# Patient Record
Sex: FEMALE | Race: WHITE | NOT HISPANIC OR LATINO | Employment: OTHER | URBAN - METROPOLITAN AREA
[De-identification: names, ages, dates, MRNs, and addresses within clinical notes are randomized per-mention and may not be internally consistent; named-entity substitution may affect disease eponyms.]

---

## 2017-06-17 LAB
BILIRUB SERPL-MCNC: NEGATIVE MG/DL
BLOOD URINE, POC: NEGATIVE
CLARITY, POC UA: NORMAL
COLOR, POC UA: YELLOW
GLUCOSE UR QL STRIP: NEGATIVE
KETONES UR QL STRIP: NEGATIVE
LEUKOCYTE EST, POC UA: NEGATIVE
NITRITE, POC UA: NEGATIVE
PH, POC UA: 6.5
PROTEIN, POC: NEGATIVE
SPECIFIC GRAVITY, POC UA: 1.01
UROBILINOGEN, POC UA: NORMAL

## 2018-05-25 LAB — RAPID GROUP A STREP (OHS): POSITIVE

## 2018-09-24 LAB — RAPID GROUP A STREP (OHS): NEGATIVE

## 2018-12-04 ENCOUNTER — HISTORICAL (OUTPATIENT)
Dept: ADMINISTRATIVE | Facility: HOSPITAL | Age: 73
End: 2018-12-04

## 2019-07-23 ENCOUNTER — HISTORICAL (OUTPATIENT)
Dept: URGENT CARE | Facility: CLINIC | Age: 74
End: 2019-07-23

## 2019-07-23 LAB
BILIRUB SERPL-MCNC: NEGATIVE MG/DL
BLOOD URINE, POC: NEGATIVE
CLARITY, POC UA: CLEAR
COLOR, POC UA: NORMAL
GLUCOSE UR QL STRIP: NEGATIVE
KETONES UR QL STRIP: NEGATIVE
LEUKOCYTE EST, POC UA: NORMAL
NITRITE, POC UA: POSITIVE
PH, POC UA: 8
PROTEIN, POC: NEGATIVE
SPECIFIC GRAVITY, POC UA: 1.01
UROBILINOGEN, POC UA: NORMAL

## 2019-11-23 LAB — RAPID GROUP A STREP (OHS): NEGATIVE

## 2020-05-13 ENCOUNTER — HISTORICAL (OUTPATIENT)
Dept: LAB | Facility: HOSPITAL | Age: 75
End: 2020-05-13

## 2020-07-20 ENCOUNTER — HISTORICAL (OUTPATIENT)
Dept: LAB | Facility: HOSPITAL | Age: 75
End: 2020-07-20

## 2020-07-20 LAB
ALBUMIN SERPL-MCNC: 4.2 GM/DL (ref 3.4–4.8)
ALBUMIN/GLOB SERPL: 1.6 RATIO (ref 1.1–2)
ALP SERPL-CCNC: 61 UNIT/L (ref 40–150)
ALT SERPL-CCNC: 21 UNIT/L (ref 0–55)
AST SERPL-CCNC: 23 UNIT/L (ref 5–34)
BILIRUB SERPL-MCNC: 0.6 MG/DL (ref 0.2–1.2)
BILIRUBIN DIRECT+TOT PNL SERPL-MCNC: 0.3 MG/DL (ref 0–0.5)
BILIRUBIN DIRECT+TOT PNL SERPL-MCNC: 0.3 MG/DL (ref 0–0.8)
BUN SERPL-MCNC: 8.7 MG/DL (ref 9.8–20.1)
CALCIUM SERPL-MCNC: 10.1 MG/DL (ref 8.4–10.2)
CHLORIDE SERPL-SCNC: 99 MMOL/L (ref 98–107)
CHOLEST SERPL-MCNC: 122 MG/DL
CHOLEST/HDLC SERPL: 2 {RATIO} (ref 0–5)
CO2 SERPL-SCNC: 27 MMOL/L (ref 23–31)
CREAT SERPL-MCNC: 0.81 MG/DL (ref 0.57–1.11)
GLOBULIN SER-MCNC: 2.6 GM/DL (ref 2.4–3.5)
GLUCOSE SERPL-MCNC: 101 MG/DL (ref 82–115)
HDLC SERPL-MCNC: 60 MG/DL (ref 40–60)
LDLC SERPL CALC-MCNC: 55 MG/DL (ref 50–140)
POTASSIUM SERPL-SCNC: 4.1 MMOL/L (ref 3.5–5.1)
PROT SERPL-MCNC: 6.8 GM/DL (ref 5.8–7.6)
SODIUM SERPL-SCNC: 134 MMOL/L (ref 136–145)
TRIGL SERPL-MCNC: 36 MG/DL (ref 0–150)
VLDLC SERPL CALC-MCNC: 7 MG/DL

## 2020-08-21 ENCOUNTER — HISTORICAL (OUTPATIENT)
Dept: RADIOLOGY | Facility: HOSPITAL | Age: 75
End: 2020-08-21

## 2021-11-05 ENCOUNTER — HISTORICAL (OUTPATIENT)
Dept: LAB | Facility: HOSPITAL | Age: 76
End: 2021-11-05

## 2021-11-05 LAB
ALBUMIN SERPL-MCNC: 4.1 GM/DL (ref 3.4–4.8)
ALP SERPL-CCNC: 61 UNIT/L (ref 40–150)
ALT SERPL-CCNC: 34 UNIT/L (ref 0–55)
AST SERPL-CCNC: 33 UNIT/L (ref 5–34)
BILIRUB SERPL-MCNC: 0.5 MG/DL (ref 0.2–1.2)
BILIRUBIN DIRECT+TOT PNL SERPL-MCNC: 0.2 MG/DL (ref 0–0.8)
BILIRUBIN DIRECT+TOT PNL SERPL-MCNC: 0.3 MG/DL (ref 0–0.5)
CHOLEST SERPL-MCNC: 143 MG/DL
CHOLEST/HDLC SERPL: 2 {RATIO} (ref 0–5)
HDLC SERPL-MCNC: 71 MG/DL (ref 40–60)
LDLC SERPL CALC-MCNC: 63 MG/DL (ref 50–140)
PROT SERPL-MCNC: 7.1 GM/DL (ref 5.8–7.6)
TRIGL SERPL-MCNC: 44 MG/DL (ref 0–150)
VLDLC SERPL CALC-MCNC: 9 MG/DL

## 2021-12-22 LAB
INFLUENZA A ANTIGEN, POC: NEGATIVE
INFLUENZA B ANTIGEN, POC: NEGATIVE
SARS-COV-2 RNA RESP QL NAA+PROBE: NEGATIVE

## 2022-02-10 ENCOUNTER — HISTORICAL (OUTPATIENT)
Dept: ADMINISTRATIVE | Facility: HOSPITAL | Age: 77
End: 2022-02-10

## 2022-02-10 ENCOUNTER — HISTORICAL (OUTPATIENT)
Dept: RADIOLOGY | Facility: HOSPITAL | Age: 77
End: 2022-02-10

## 2022-03-20 ENCOUNTER — HISTORICAL (OUTPATIENT)
Dept: URGENT CARE | Facility: CLINIC | Age: 77
End: 2022-03-20

## 2022-04-12 ENCOUNTER — HISTORICAL (OUTPATIENT)
Dept: ADMINISTRATIVE | Facility: HOSPITAL | Age: 77
End: 2022-04-12
Payer: MEDICARE

## 2022-04-30 VITALS
BODY MASS INDEX: 28.07 KG/M2 | HEIGHT: 64 IN | SYSTOLIC BLOOD PRESSURE: 168 MMHG | OXYGEN SATURATION: 96 % | DIASTOLIC BLOOD PRESSURE: 82 MMHG | WEIGHT: 164.44 LBS

## 2022-09-01 ENCOUNTER — APPOINTMENT (OUTPATIENT)
Dept: LAB | Facility: HOSPITAL | Age: 77
End: 2022-09-01
Attending: INTERNAL MEDICINE
Payer: MEDICARE

## 2022-09-01 DIAGNOSIS — I25.10 CORONARY ARTERY DISEASE, UNSPECIFIED VESSEL OR LESION TYPE, UNSPECIFIED WHETHER ANGINA PRESENT, UNSPECIFIED WHETHER NATIVE OR TRANSPLANTED HEART: Primary | ICD-10-CM

## 2022-09-01 DIAGNOSIS — E78.2 MIXED HYPERLIPIDEMIA: ICD-10-CM

## 2022-09-01 LAB
ALBUMIN SERPL-MCNC: 4.3 GM/DL (ref 3.4–4.8)
ALBUMIN/GLOB SERPL: 1.4 RATIO (ref 1.1–2)
ALP SERPL-CCNC: 60 UNIT/L (ref 40–150)
ALT SERPL-CCNC: 29 UNIT/L (ref 0–55)
AST SERPL-CCNC: 30 UNIT/L (ref 5–34)
BILIRUBIN DIRECT+TOT PNL SERPL-MCNC: 0.6 MG/DL
BUN SERPL-MCNC: 14 MG/DL (ref 9.8–20.1)
CALCIUM SERPL-MCNC: 10.3 MG/DL (ref 8.4–10.2)
CHLORIDE SERPL-SCNC: 104 MMOL/L (ref 98–107)
CHOLEST SERPL-MCNC: 149 MG/DL
CHOLEST/HDLC SERPL: 2 {RATIO} (ref 0–5)
CO2 SERPL-SCNC: 28 MMOL/L (ref 23–31)
CREAT SERPL-MCNC: 0.8 MG/DL (ref 0.55–1.02)
GFR SERPLBLD CREATININE-BSD FMLA CKD-EPI: >60 MLS/MIN/1.73/M2
GLOBULIN SER-MCNC: 3.1 GM/DL (ref 2.4–3.5)
GLUCOSE SERPL-MCNC: 101 MG/DL (ref 82–115)
HDLC SERPL-MCNC: 72 MG/DL (ref 35–60)
LDLC SERPL CALC-MCNC: 67 MG/DL (ref 50–140)
POTASSIUM SERPL-SCNC: 4 MMOL/L (ref 3.5–5.1)
PROT SERPL-MCNC: 7.4 GM/DL (ref 5.8–7.6)
SODIUM SERPL-SCNC: 143 MMOL/L (ref 136–145)
TRIGL SERPL-MCNC: 50 MG/DL (ref 37–140)
VLDLC SERPL CALC-MCNC: 10 MG/DL

## 2022-09-01 PROCEDURE — 36415 COLL VENOUS BLD VENIPUNCTURE: CPT

## 2022-09-01 PROCEDURE — 80053 COMPREHEN METABOLIC PANEL: CPT

## 2022-09-01 PROCEDURE — 80061 LIPID PANEL: CPT

## 2022-09-22 ENCOUNTER — HISTORICAL (OUTPATIENT)
Dept: ADMINISTRATIVE | Facility: HOSPITAL | Age: 77
End: 2022-09-22
Payer: MEDICARE

## 2022-09-23 ENCOUNTER — OFFICE VISIT (OUTPATIENT)
Dept: URGENT CARE | Facility: CLINIC | Age: 77
End: 2022-09-23
Payer: MEDICARE

## 2022-09-23 VITALS
HEART RATE: 83 BPM | HEIGHT: 64 IN | OXYGEN SATURATION: 96 % | SYSTOLIC BLOOD PRESSURE: 146 MMHG | DIASTOLIC BLOOD PRESSURE: 77 MMHG | BODY MASS INDEX: 25.95 KG/M2 | TEMPERATURE: 99 F | RESPIRATION RATE: 20 BRPM | WEIGHT: 152 LBS

## 2022-09-23 DIAGNOSIS — R19.5 LOOSE BOWEL MOVEMENT: Primary | ICD-10-CM

## 2022-09-23 PROCEDURE — 99214 PR OFFICE/OUTPT VISIT, EST, LEVL IV, 30-39 MIN: ICD-10-PCS | Mod: ,,, | Performed by: FAMILY MEDICINE

## 2022-09-23 PROCEDURE — 99214 OFFICE O/P EST MOD 30 MIN: CPT | Mod: ,,, | Performed by: FAMILY MEDICINE

## 2022-09-23 RX ORDER — FLECAINIDE ACETATE 100 MG/1
100 TABLET ORAL 2 TIMES DAILY
COMMUNITY
Start: 2022-07-31

## 2022-09-23 RX ORDER — CIPROFLOXACIN 500 MG/1
500 TABLET ORAL 2 TIMES DAILY
Status: ON HOLD | COMMUNITY
Start: 2022-09-21 | End: 2024-02-21 | Stop reason: HOSPADM

## 2022-09-23 RX ORDER — ROSUVASTATIN CALCIUM 20 MG/1
20 TABLET, COATED ORAL DAILY
Status: ON HOLD | COMMUNITY
Start: 2022-08-01 | End: 2024-02-21 | Stop reason: HOSPADM

## 2022-09-23 RX ORDER — EZETIMIBE 10 MG/1
10 TABLET ORAL NIGHTLY
COMMUNITY
Start: 2022-07-22

## 2022-09-23 RX ORDER — LISINOPRIL AND HYDROCHLOROTHIAZIDE 20; 25 MG/1; MG/1
1 TABLET ORAL DAILY
COMMUNITY
Start: 2022-08-01

## 2022-09-23 NOTE — PROGRESS NOTES
"Subjective:       Patient ID: Shelli Flores is a 77 y.o. female.    Vitals:  height is 5' 3.5" (1.613 m) and weight is 68.9 kg (152 lb). Her oral temperature is 98.8 °F (37.1 °C). Her blood pressure is 146/77 (abnormal) and her pulse is 83. Her respiration is 20 and oxygen saturation is 96%.     Chief Complaint: Diarrhea (Liquid stool x 2 weeks, progressed to abd cramping and excess gas, took Flagyl x 1 but d/c due to heart issue, starting having UTI symptoms, taking Cipro)    Liquid stool x 2 weeks, progressed to abd cramping and excess gas, took Flagyl x 1 but d/c due to heart issue, starting having UTI symptoms, taking Cipro from family member that is a NP.   No studies done at this time.  No fever, no melena or hematochezia.  No focal abdominal pain.   Overall better in regards to cramping.  Had dysuria a few days ago that improved after starting cipro.  Max 2 loose BM a day.     Diarrhea   Pertinent negatives include no fever or vomiting.   Constitution: Negative for fatigue and fever.   Gastrointestinal:  Positive for diarrhea. Negative for nausea, vomiting, constipation, bright red blood in stool and rectal bleeding.   Genitourinary:  Positive for dysuria.   Neurological:  Negative for dizziness.     Objective:      Physical Exam   Constitutional: She is oriented to person, place, and time.   Cardiovascular: Normal rate and regular rhythm.   Pulmonary/Chest: Effort normal and breath sounds normal.   Abdominal: Normal appearance. flat abdomen   Neurological: no focal deficit. She is alert and oriented to person, place, and time.   Skin: Skin is warm. Capillary refill takes less than 2 seconds.   Psychiatric: Her behavior is normal. Mood normal.   Nursing note and vitals reviewed.      Assessment:       1. Loose bowel movement          Plan:         Loose bowel movement          No red flags on exam today.  >30 minutes patient care.          "

## 2023-09-01 ENCOUNTER — HOSPITAL ENCOUNTER (OUTPATIENT)
Dept: RADIOLOGY | Facility: HOSPITAL | Age: 78
Discharge: HOME | End: 2023-09-01
Attending: OBSTETRICS & GYNECOLOGY
Payer: MEDICARE

## 2023-09-01 DIAGNOSIS — Z12.31 ENCOUNTER FOR SCREENING MAMMOGRAM FOR BREAST CANCER: ICD-10-CM

## 2023-09-01 PROCEDURE — 77063 BREAST TOMOSYNTHESIS BI: CPT | Mod: 26,,, | Performed by: STUDENT IN AN ORGANIZED HEALTH CARE EDUCATION/TRAINING PROGRAM

## 2023-09-01 PROCEDURE — 77067 SCR MAMMO BI INCL CAD: CPT | Mod: 26,,, | Performed by: STUDENT IN AN ORGANIZED HEALTH CARE EDUCATION/TRAINING PROGRAM

## 2023-09-01 PROCEDURE — 77063 MAMMO DIGITAL SCREENING BILAT WITH TOMO: ICD-10-PCS | Mod: 26,,, | Performed by: STUDENT IN AN ORGANIZED HEALTH CARE EDUCATION/TRAINING PROGRAM

## 2023-09-01 PROCEDURE — 77067 SCR MAMMO BI INCL CAD: CPT | Mod: TC

## 2023-09-01 PROCEDURE — 77067 MAMMO DIGITAL SCREENING BILAT WITH TOMO: ICD-10-PCS | Mod: 26,,, | Performed by: STUDENT IN AN ORGANIZED HEALTH CARE EDUCATION/TRAINING PROGRAM

## 2023-10-10 ENCOUNTER — OFFICE VISIT (OUTPATIENT)
Dept: URGENT CARE | Facility: CLINIC | Age: 78
End: 2023-10-10
Payer: MEDICARE

## 2023-10-10 VITALS
TEMPERATURE: 99 F | WEIGHT: 152 LBS | HEART RATE: 76 BPM | HEIGHT: 64 IN | BODY MASS INDEX: 25.95 KG/M2 | DIASTOLIC BLOOD PRESSURE: 83 MMHG | RESPIRATION RATE: 12 BRPM | OXYGEN SATURATION: 97 % | SYSTOLIC BLOOD PRESSURE: 146 MMHG

## 2023-10-10 DIAGNOSIS — N39.0 URINARY TRACT INFECTION WITHOUT HEMATURIA, SITE UNSPECIFIED: Primary | ICD-10-CM

## 2023-10-10 DIAGNOSIS — R30.0 BURNING WITH URINATION: ICD-10-CM

## 2023-10-10 LAB
BILIRUB UR QL STRIP: POSITIVE
GLUCOSE UR QL STRIP: NEGATIVE
KETONES UR QL STRIP: NEGATIVE
LEUKOCYTE ESTERASE UR QL STRIP: POSITIVE
PH, POC UA: 6.5
POC BLOOD, URINE: NEGATIVE
POC NITRATES, URINE: POSITIVE
PROT UR QL STRIP: NEGATIVE
SP GR UR STRIP: 1.01 (ref 1–1.03)
UROBILINOGEN UR STRIP-ACNC: 4 (ref 0.1–1.1)

## 2023-10-10 PROCEDURE — 87088 URINE BACTERIA CULTURE: CPT | Performed by: FAMILY MEDICINE

## 2023-10-10 PROCEDURE — 99213 PR OFFICE/OUTPT VISIT, EST, LEVL III, 20-29 MIN: ICD-10-PCS | Mod: ,,, | Performed by: FAMILY MEDICINE

## 2023-10-10 PROCEDURE — 81003 POCT URINALYSIS, DIPSTICK, AUTOMATED, W/O SCOPE: ICD-10-PCS | Mod: QW,,, | Performed by: FAMILY MEDICINE

## 2023-10-10 PROCEDURE — 99213 OFFICE O/P EST LOW 20 MIN: CPT | Mod: ,,, | Performed by: FAMILY MEDICINE

## 2023-10-10 PROCEDURE — 81003 URINALYSIS AUTO W/O SCOPE: CPT | Mod: QW,,, | Performed by: FAMILY MEDICINE

## 2023-10-10 PROCEDURE — 87186 SC STD MICRODIL/AGAR DIL: CPT | Performed by: FAMILY MEDICINE

## 2023-10-10 RX ORDER — SULFAMETHOXAZOLE AND TRIMETHOPRIM 800; 160 MG/1; MG/1
1 TABLET ORAL 2 TIMES DAILY
Qty: 14 TABLET | Refills: 0 | Status: SHIPPED | OUTPATIENT
Start: 2023-10-10 | End: 2023-10-17

## 2023-10-10 RX ORDER — ALENDRONATE SODIUM 70 MG/1
TABLET ORAL
COMMUNITY
Start: 2023-04-18

## 2023-10-10 NOTE — PATIENT INSTRUCTIONS
Medication sent to pharmacy.  We will culture urine and call you with the results when they become available.  Monitor for fever.  Hydrate.  If your symptoms persist or worsen or you develop fever back pain or belly pain return to clinic or seek medical attention immediately  As discussed, if your symptoms return quickly after taking the antibiotics or symptoms are not improving, I would recommend seeing Urology.  We can refer you to one if needed.

## 2023-10-10 NOTE — PROGRESS NOTES
"Subjective:      Patient ID: Shelli Flores is a 78 y.o. female.    Vitals:  height is 5' 3.5" (1.613 m) and weight is 68.9 kg (152 lb). Her temperature is 98.6 °F (37 °C). Her blood pressure is 146/83 (abnormal) and her pulse is 76. Her respiration is 12 and oxygen saturation is 97%.     Chief Complaint: Urinary Tract Infection (Patient complains of pressure and pain with unrination. She has right side pain in her pelvic area. She had a dx of UTI 2 weeks ago. She was prescribed Macrobid and Pyridium. )    78-year-old female presents to clinic complaining of a three-day history of urinary burning frequency and urgency with low back pain.  Patient states she was having similar symptoms over 2 weeks ago obtained Macrobid and finished it.  States her symptoms did resolve but returned a few days ago.  Denies any fever or abdominal pain.        Constitution: Negative.   HENT: Negative.     Cardiovascular: Negative.    Eyes: Negative.    Respiratory: Negative.     Gastrointestinal: Negative.    Genitourinary:  Positive for dysuria, frequency and urgency.   Musculoskeletal: Negative.    Skin: Negative.    Allergic/Immunologic: Negative.    Neurological: Negative.    Hematologic/Lymphatic: Negative.       Objective:     Physical Exam   Constitutional: She is oriented to person, place, and time. She appears well-developed. She is cooperative.  Non-toxic appearance. She does not appear ill. No distress.   HENT:   Head: Normocephalic and atraumatic.   Eyes: Conjunctivae and lids are normal.   Neck: Trachea normal and phonation normal. Neck supple.   Pulmonary/Chest: Effort normal. No respiratory distress.   Abdominal: Normal appearance. Soft. There is no abdominal tenderness. There is no rebound, no guarding, no left CVA tenderness and no right CVA tenderness.   Neurological: She is alert and oriented to person, place, and time. She exhibits normal muscle tone.   Skin: Skin is warm, dry, intact and not diaphoretic. " "  Psychiatric: Her speech is normal and behavior is normal. Mood, judgment and thought content normal.   Nursing note and vitals reviewed.         Previous History      Review of patient's allergies indicates:  No Known Allergies    Past Medical History:   Diagnosis Date    Hyperlipidemia     Hypertension     V-tach      Current Outpatient Medications   Medication Instructions    alendronate (FOSAMAX) 70 MG tablet PLEASE SEE ATTACHED FOR DETAILED DIRECTIONS    ciprofloxacin HCl (CIPRO) 500 mg, Oral, 2 times daily    ezetimibe (ZETIA) 10 mg, Oral, Nightly    flecainide (TAMBOCOR) 100 mg, Oral, 2 times daily    lisinopriL-hydrochlorothiazide (PRINZIDE,ZESTORETIC) 20-25 mg Tab 1 tablet, Oral, Daily    rosuvastatin (CRESTOR) 20 mg, Oral, Daily    sulfamethoxazole-trimethoprim 800-160mg (BACTRIM DS) 800-160 mg Tab 1 tablet, Oral, 2 times daily     Past Surgical History:   Procedure Laterality Date    HYSTERECTOMY      skin cancer removal       Family History   Problem Relation Age of Onset    Breast cancer Mother     Hypertension Father     Heart disease Father        Social History     Tobacco Use    Smoking status: Never     Passive exposure: Never    Smokeless tobacco: Never   Substance Use Topics    Alcohol use: Yes     Alcohol/week: 4.0 standard drinks of alcohol     Types: 4 Glasses of wine per week    Drug use: Never        Physical Exam      Vital Signs Reviewed   BP (!) 146/83   Pulse 76   Temp 98.6 °F (37 °C)   Resp 12   Ht 5' 3.5" (1.613 m)   Wt 68.9 kg (152 lb)   SpO2 97%   BMI 26.50 kg/m²        Procedures    Procedures     Labs     Results for orders placed or performed in visit on 10/10/23   POCT Urinalysis, Dipstick, Automated, W/O Scope   Result Value Ref Range    POC Blood, Urine Negative Negative    POC Bilirubin, Urine Positive (A) Negative    POC Urobilinogen, Urine 4 (A) 0.1 - 1.1    POC Ketones, Urine Negative Negative    POC Protein, Urine Negative Negative    POC Nitrates, Urine Positive " (A) Negative    POC Glucose, Urine Negative Negative    pH, UA 6.5     POC Specific Gravity, Urine 1.015 1.003 - 1.029    POC Leukocytes, Urine Positive (A) Negative       Assessment:     1. Urinary tract infection without hematuria, site unspecified    2. Burning with urination        Plan:   Medication sent to pharmacy.  We will culture urine and call you with the results when they become available.  Monitor for fever.  Hydrate.  If your symptoms persist or worsen or you develop fever back pain or belly pain return to clinic or seek medical attention immediately  As discussed, if your symptoms return quickly after taking the antibiotics or symptoms are not improving, I would recommend seeing Urology.  We can refer you to one if needed.    Urinary tract infection without hematuria, site unspecified  -     Urine culture    Burning with urination  -     POCT Urinalysis, Dipstick, Automated, W/O Scope    Other orders  -     sulfamethoxazole-trimethoprim 800-160mg (BACTRIM DS) 800-160 mg Tab; Take 1 tablet by mouth 2 (two) times daily. for 7 days  Dispense: 14 tablet; Refill: 0

## 2023-10-12 DIAGNOSIS — N39.0 URINARY TRACT INFECTION WITHOUT HEMATURIA, SITE UNSPECIFIED: Primary | ICD-10-CM

## 2023-10-12 LAB — BACTERIA UR CULT: ABNORMAL

## 2023-10-12 RX ORDER — AMOXICILLIN AND CLAVULANATE POTASSIUM 875; 125 MG/1; MG/1
1 TABLET, FILM COATED ORAL EVERY 12 HOURS
Qty: 14 TABLET | Refills: 0 | Status: SHIPPED | OUTPATIENT
Start: 2023-10-12 | End: 2023-10-19

## 2023-12-16 ENCOUNTER — HOSPITAL ENCOUNTER (EMERGENCY)
Facility: HOSPITAL | Age: 78
Discharge: HOME | End: 2023-12-16
Attending: EMERGENCY MEDICINE
Payer: MEDICARE

## 2023-12-16 VITALS
TEMPERATURE: 99 F | BODY MASS INDEX: 27.31 KG/M2 | RESPIRATION RATE: 18 BRPM | OXYGEN SATURATION: 100 % | WEIGHT: 160 LBS | DIASTOLIC BLOOD PRESSURE: 85 MMHG | SYSTOLIC BLOOD PRESSURE: 169 MMHG | HEIGHT: 64 IN | HEART RATE: 80 BPM
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
## 2023-12-16 DIAGNOSIS — S52.124A CLOSED NONDISPLACED FRACTURE OF HEAD OF RIGHT RADIUS, INITIAL ENCOUNTER: Primary | ICD-10-CM

## 2023-12-16 PROCEDURE — 29105 APPLICATION LONG ARM SPLINT: CPT | Mod: RT

## 2023-12-16 PROCEDURE — 99283 EMERGENCY DEPT VISIT LOW MDM: CPT | Mod: 25

## 2023-12-16 NOTE — ED PROVIDER NOTES
Encounter Date: 12/16/2023       History     Chief Complaint   Patient presents with    Fall     Pt complaint of pain to right elbow after a trip and fall this am. Pt complaint of some swelling and pain at extending elbow     78-year-old female got out of bed this morning and tripped over a step stool falling onto her right elbow.  She complains of right elbow pain, posterior elbow, holding it flexed 90° and it is worse if she tries to straighten it.  She denies any other injuries.        Review of patient's allergies indicates:  No Known Allergies  Past Medical History:   Diagnosis Date    Hyperlipidemia     Hypertension     V-tach      Past Surgical History:   Procedure Laterality Date    HYSTERECTOMY      skin cancer removal       Family History   Problem Relation Age of Onset    Breast cancer Mother     Hypertension Father     Heart disease Father      Social History     Tobacco Use    Smoking status: Never     Passive exposure: Never    Smokeless tobacco: Never   Substance Use Topics    Alcohol use: Yes     Alcohol/week: 4.0 standard drinks of alcohol     Types: 4 Glasses of wine per week    Drug use: Never     Review of Systems   Musculoskeletal:  Positive for arthralgias.   All other systems reviewed and are negative.      Physical Exam     Initial Vitals [12/16/23 0713]   BP Pulse Resp Temp SpO2   (!) 169/85 80 18 98.6 °F (37 °C) 100 %      MAP       --         Physical Exam    Nursing note and vitals reviewed.  Constitutional: She appears well-developed and well-nourished.   HENT:   Head: Normocephalic and atraumatic.   Eyes: EOM are normal. Pupils are equal, round, and reactive to light.   Neck:   No tenderness   Normal range of motion.  Cardiovascular:  Normal rate and regular rhythm.           Pulmonary/Chest: Breath sounds normal. No respiratory distress.   Abdominal: Abdomen is soft. There is no abdominal tenderness.   Musculoskeletal:      Cervical back: Normal range of motion.      Comments: Right  elbow has obvious swelling and a small abrasion and bruising.  Holding flex 90° and can extend a small amount but then experiences pain.  Radial pulses 2+.     Neurological: She is alert and oriented to person, place, and time.   Skin: Skin is warm and dry. Capillary refill takes less than 2 seconds.   Psychiatric: She has a normal mood and affect. Thought content normal.         ED Course   Splint Application    Date/Time: 12/17/2023 9:40 AM    Performed by: Ana Georges MD  Authorized by: Ana Georges MD  Consent Done: Yes  Risks and benefits: risks, benefits and alternatives were discussed  Consent given by: patient  Patient understanding: patient states understanding of the procedure being performed  Imaging studies: imaging studies available  Patient identity confirmed: verbally with patient  Location details: right elbow  Splint type: sugar tong  Supplies used: Ortho-Glass  Post-procedure: The splinted body part was neurovascularly unchanged following the procedure.  Patient tolerance: Patient tolerated the procedure well with no immediate complications        Labs Reviewed - No data to display       Imaging Results              X-Ray Elbow Complete Right (Final result)  Result time 12/16/23 09:30:03      Final result by Meche Venegas MD (12/16/23 09:30:03)                   Impression:      There is no abnormality seen      Electronically signed by: Giovanni Venegas  Date:    12/16/2023  Time:    09:30               Narrative:    EXAMINATION:  XR ELBOW COMPLETE 3 VIEW RIGHT    CLINICAL HISTORY:  . Unspecified fall, initial encounter    TECHNIQUE:  AP, lateral, and oblique views of the right elbow were performed.    COMPARISON:  None    FINDINGS:  The bones and joints are in good anatomic alignment.  No fracture is seen.  No dislocation is seen.  No soft tissue abnormality is seen.                                       Medications - No data to display  Medical Decision  Making  78-year-old female got out of bed this morning and tripped over a step stool falling onto her right elbow.  She complains of right elbow pain, posterior elbow, holding it flexed 90° and it is worse if she tries to straighten it.  She denies any other injuries.      Differential diagnosis includes but is not limited to fracture, dislocation, strain, contusion    Amount and/or Complexity of Data Reviewed  Radiology: ordered.  Discussion of management or test interpretation with external provider(s): Patient was seen and evaluated in the emergency department with history, physical exam, x-ray.  I suspected a radial head fracture and placed her in a sugar-tong splint.  The x-ray interpretation was not available at the time of discharge and this was discussed with the patient.  She has the my [facility] and will follow-up her x-ray results.                                      Clinical Impression:  Final diagnoses:  [W19.XXXA] Fall  [S52.124A] Closed nondisplaced fracture of head of right radius, initial encounter (Primary)          ED Disposition Condition    Discharge Stable          ED Prescriptions    None       Follow-up Information       Follow up With Specialties Details Why Contact Info    Klever Wolff MD Orthopedic Surgery Schedule an appointment as soon as possible for a visit   [address]  [address]  [facility] [address]  [phone]               Ana Georges MD  12/17/23 0938

## 2023-12-16 NOTE — ED TRIAGE NOTES
Pt complaint of pain to right elbow after a trip and fall this am. Pt complaint of some swelling and pain at extending elbow

## 2024-02-08 ENCOUNTER — LAB VISIT (OUTPATIENT)
Dept: LAB | Facility: HOSPITAL | Age: 79
End: 2024-02-08
Attending: ORTHOPAEDIC SURGERY
Payer: MEDICARE

## 2024-02-08 DIAGNOSIS — M06.9 RHEUMATOID ARTHRITIS, INVOLVING UNSPECIFIED SITE, UNSPECIFIED WHETHER RHEUMATOID FACTOR PRESENT: Primary | ICD-10-CM

## 2024-02-08 DIAGNOSIS — Z52.008 UNSPECIFIED DONOR, OTHER BLOOD: ICD-10-CM

## 2024-02-08 LAB
BASOPHILS # BLD AUTO: 0.02 X10(3)/MCL
BASOPHILS NFR BLD AUTO: 0.2 %
CRP SERPL HS-MCNC: 3.45 MG/L
EOSINOPHIL # BLD AUTO: 0.02 X10(3)/MCL (ref 0–0.9)
EOSINOPHIL NFR BLD AUTO: 0.2 %
ERYTHROCYTE [DISTWIDTH] IN BLOOD BY AUTOMATED COUNT: 14.6 % (ref 11.5–17)
ERYTHROCYTE [SEDIMENTATION RATE] IN BLOOD: 38 MM/HR (ref 0–20)
HCT VFR BLD AUTO: 38.8 % (ref 37–47)
HGB BLD-MCNC: 13.1 G/DL (ref 12–16)
IMM GRANULOCYTES # BLD AUTO: 0.01 X10(3)/MCL (ref 0–0.04)
IMM GRANULOCYTES NFR BLD AUTO: 0.1 %
LYMPHOCYTES # BLD AUTO: 2.07 X10(3)/MCL (ref 0.6–4.6)
LYMPHOCYTES NFR BLD AUTO: 24.7 %
MCH RBC QN AUTO: 33.6 PG (ref 27–31)
MCHC RBC AUTO-ENTMCNC: 33.8 G/DL (ref 33–36)
MCV RBC AUTO: 99.5 FL (ref 80–94)
MONOCYTES # BLD AUTO: 0.59 X10(3)/MCL (ref 0.1–1.3)
MONOCYTES NFR BLD AUTO: 7 %
NEUTROPHILS # BLD AUTO: 5.67 X10(3)/MCL (ref 2.1–9.2)
NEUTROPHILS NFR BLD AUTO: 67.8 %
NRBC BLD AUTO-RTO: 0 %
PLATELET # BLD AUTO: 259 X10(3)/MCL (ref 130–400)
PMV BLD AUTO: 9.7 FL (ref 7.4–10.4)
RBC # BLD AUTO: 3.9 X10(6)/MCL (ref 4.2–5.4)
RHEUMATOID FACT SERPL-ACNC: <13 IU/ML
WBC # SPEC AUTO: 8.38 X10(3)/MCL (ref 4.5–11.5)

## 2024-02-08 PROCEDURE — 85652 RBC SED RATE AUTOMATED: CPT

## 2024-02-08 PROCEDURE — 86812 HLA TYPING A B OR C: CPT

## 2024-02-08 PROCEDURE — 86225 DNA ANTIBODY NATIVE: CPT

## 2024-02-08 PROCEDURE — 86431 RHEUMATOID FACTOR QUANT: CPT

## 2024-02-08 PROCEDURE — 85025 COMPLETE CBC W/AUTO DIFF WBC: CPT

## 2024-02-08 PROCEDURE — 36415 COLL VENOUS BLD VENIPUNCTURE: CPT

## 2024-02-08 PROCEDURE — 86141 C-REACTIVE PROTEIN HS: CPT

## 2024-02-08 PROCEDURE — 86200 CCP ANTIBODY: CPT

## 2024-02-09 LAB
HLA TYP COMM-IMP: NORMAL
HLA-B27 QL FC: NEGATIVE

## 2024-02-10 LAB
ANTINUCLEAR ANTIBODY SCREEN (OHS): NEGATIVE
CYCLIC CITRULLINATED PEPTIDE (CCP) (OHS): <0.4 U/ML
DSDNA AB QUANT (OHS): <0.6 IU/ML

## 2024-02-20 ENCOUNTER — HOSPITAL ENCOUNTER (INPATIENT)
Facility: HOSPITAL | Age: 79
LOS: 1 days | Discharge: HOME | DRG: 041 | End: 2024-02-21
Attending: EMERGENCY MEDICINE | Admitting: INTERNAL MEDICINE
Payer: MEDICARE

## 2024-02-20 DIAGNOSIS — I63.9 ACUTE CVA (CEREBROVASCULAR ACCIDENT): ICD-10-CM

## 2024-02-20 DIAGNOSIS — G81.94 LEFT HEMIPLEGIA: ICD-10-CM

## 2024-02-20 DIAGNOSIS — I63.9 STROKE: ICD-10-CM

## 2024-02-20 DIAGNOSIS — R29.818 NEUROLOGIC DEFICIT DUE TO ACUTE ISCHEMIC CEREBROVASCULAR ACCIDENT (CVA): Primary | ICD-10-CM

## 2024-02-20 DIAGNOSIS — I63.9 NEUROLOGIC DEFICIT DUE TO ACUTE ISCHEMIC CEREBROVASCULAR ACCIDENT (CVA): Primary | ICD-10-CM

## 2024-02-20 LAB
ALBUMIN SERPL-MCNC: 3.8 G/DL (ref 3.4–4.8)
ALBUMIN/GLOB SERPL: 1.4 RATIO (ref 1.1–2)
ALP SERPL-CCNC: 69 UNIT/L (ref 40–150)
ALT SERPL-CCNC: 18 UNIT/L (ref 0–55)
ANION GAP SERPL CALC-SCNC: 15 MMOL/L (ref 8–16)
APPEARANCE UR: CLEAR
AST SERPL-CCNC: 23 UNIT/L (ref 5–34)
AV INDEX (PROSTH): 0.78
AV MEAN GRADIENT: 3 MMHG
AV PEAK GRADIENT: 5 MMHG
AV VALVE AREA BY VELOCITY RATIO: 2.69 CM²
AV VALVE AREA: 2.46 CM²
AV VELOCITY RATIO: 0.86
BACTERIA #/AREA URNS AUTO: ABNORMAL /HPF
BASOPHILS # BLD AUTO: 0.04 X10(3)/MCL
BASOPHILS NFR BLD AUTO: 0.3 %
BILIRUB SERPL-MCNC: 0.4 MG/DL
BILIRUB UR QL STRIP.AUTO: NEGATIVE
BSA FOR ECHO PROCEDURE: 1.86 M2
BUN SERPL-MCNC: 13.9 MG/DL (ref 9.8–20.1)
BUN SERPL-MCNC: 14 MG/DL (ref 6–30)
CALCIUM SERPL-MCNC: 9.8 MG/DL (ref 8.4–10.2)
CHLORIDE SERPL-SCNC: 101 MMOL/L (ref 98–107)
CHLORIDE SERPL-SCNC: 96 MMOL/L (ref 95–110)
CHOLEST SERPL-MCNC: 140 MG/DL
CHOLEST/HDLC SERPL: 2 {RATIO} (ref 0–5)
CO2 SERPL-SCNC: 26 MMOL/L (ref 23–31)
COLOR UR AUTO: ABNORMAL
CREAT SERPL-MCNC: 0.7 MG/DL (ref 0.5–1.4)
CREAT SERPL-MCNC: 0.79 MG/DL (ref 0.55–1.02)
CV ECHO LV RWT: 0.55 CM
DOP CALC AO PEAK VEL: 1.11 M/S
DOP CALC AO VTI: 22.2 CM
DOP CALC LVOT AREA: 3.1 CM2
DOP CALC LVOT DIAMETER: 2 CM
DOP CALC LVOT PEAK VEL: 0.95 M/S
DOP CALC LVOT STROKE VOLUME: 54.64 CM3
DOP CALC MV VTI: 17.8 CM
DOP CALCLVOT PEAK VEL VTI: 17.4 CM
E WAVE DECELERATION TIME: 243 MSEC
E/A RATIO: 0.62
E/E' RATIO: 7 M/S
ECHO LV POSTERIOR WALL: 1.31 CM (ref 0.6–1.1)
EOSINOPHIL # BLD AUTO: 0.04 X10(3)/MCL (ref 0–0.9)
EOSINOPHIL NFR BLD AUTO: 0.3 %
ERYTHROCYTE [DISTWIDTH] IN BLOOD BY AUTOMATED COUNT: 13.9 % (ref 11.5–17)
FRACTIONAL SHORTENING: 36 % (ref 28–44)
GFR SERPLBLD CREATININE-BSD FMLA CKD-EPI: >60 MLS/MIN/1.73/M2
GLOBULIN SER-MCNC: 2.8 GM/DL (ref 2.4–3.5)
GLUCOSE SERPL-MCNC: 109 MG/DL (ref 82–115)
GLUCOSE SERPL-MCNC: 115 MG/DL (ref 70–110)
GLUCOSE UR QL STRIP.AUTO: NORMAL
HCT VFR BLD AUTO: 40.6 % (ref 37–47)
HCT VFR BLD CALC: 43 %PCV (ref 36–54)
HDLC SERPL-MCNC: 65 MG/DL (ref 35–60)
HGB BLD-MCNC: 13.6 G/DL (ref 12–16)
HGB BLD-MCNC: 15 G/DL
IMM GRANULOCYTES # BLD AUTO: 0.03 X10(3)/MCL (ref 0–0.04)
IMM GRANULOCYTES NFR BLD AUTO: 0.3 %
INTERVENTRICULAR SEPTUM: 1.4 CM (ref 0.6–1.1)
KETONES UR QL STRIP.AUTO: NEGATIVE
LDLC SERPL CALC-MCNC: 64 MG/DL (ref 50–140)
LEFT ATRIUM SIZE: 3.4 CM
LEFT ATRIUM VOLUME INDEX MOD: 9.8 ML/M2
LEFT ATRIUM VOLUME MOD: 17.8 CM3
LEFT INTERNAL DIMENSION IN SYSTOLE: 3.09 CM (ref 2.1–4)
LEFT VENTRICLE DIASTOLIC VOLUME INDEX: 59.34 ML/M2
LEFT VENTRICLE DIASTOLIC VOLUME: 108 ML
LEFT VENTRICLE MASS INDEX: 143 G/M2
LEFT VENTRICLE SYSTOLIC VOLUME INDEX: 20.7 ML/M2
LEFT VENTRICLE SYSTOLIC VOLUME: 37.6 ML
LEFT VENTRICULAR INTERNAL DIMENSION IN DIASTOLE: 4.8 CM (ref 3.5–6)
LEFT VENTRICULAR MASS: 260.99 G
LEUKOCYTE ESTERASE UR QL STRIP.AUTO: 75
LV LATERAL E/E' RATIO: 5.44 M/S
LV SEPTAL E/E' RATIO: 9.8 M/S
LVOT MG: 2 MMHG
LVOT MV: 0.6 CM/S
LYMPHOCYTES # BLD AUTO: 1.71 X10(3)/MCL (ref 0.6–4.6)
LYMPHOCYTES NFR BLD AUTO: 14.8 %
MCH RBC QN AUTO: 32.7 PG (ref 27–31)
MCHC RBC AUTO-ENTMCNC: 33.5 G/DL (ref 33–36)
MCV RBC AUTO: 97.6 FL (ref 80–94)
MONOCYTES # BLD AUTO: 0.91 X10(3)/MCL (ref 0.1–1.3)
MONOCYTES NFR BLD AUTO: 7.9 %
MUCOUS THREADS URNS QL MICRO: ABNORMAL /LPF
MV MEAN GRADIENT: 1 MMHG
MV PEAK A VEL: 0.79 M/S
MV PEAK E VEL: 0.49 M/S
MV PEAK GRADIENT: 3 MMHG
MV STENOSIS PRESSURE HALF TIME: 32 MS
MV VALVE AREA BY CONTINUITY EQUATION: 3.07 CM2
MV VALVE AREA P 1/2 METHOD: 6.88 CM2
NEUTROPHILS # BLD AUTO: 8.79 X10(3)/MCL (ref 2.1–9.2)
NEUTROPHILS NFR BLD AUTO: 76.4 %
NITRITE UR QL STRIP.AUTO: ABNORMAL
NRBC BLD AUTO-RTO: 0 %
OHS LV EJECTION FRACTION SIMPSONS BIPLANE MOD: 56 %
PH UR STRIP.AUTO: 7.5 [PH]
PISA TR MAX VEL: 1.62 M/S
PLATELET # BLD AUTO: 260 X10(3)/MCL (ref 130–400)
PMV BLD AUTO: 9.6 FL (ref 7.4–10.4)
POC IONIZED CALCIUM: 1.2 MMOL/L (ref 1.06–1.42)
POC TCO2 (MEASURED): 28 MMOL/L (ref 23–29)
POCT GLUCOSE: 127 MG/DL (ref 70–110)
POTASSIUM BLD-SCNC: 3.5 MMOL/L (ref 3.5–5.1)
POTASSIUM SERPL-SCNC: 3.7 MMOL/L (ref 3.5–5.1)
PROT SERPL-MCNC: 6.6 GM/DL (ref 5.8–7.6)
PROT UR QL STRIP.AUTO: NEGATIVE
PV PEAK GRADIENT: 4 MMHG
PV PEAK VELOCITY: 0.96 M/S
RA PRESSURE ESTIMATED: 3 MMHG
RBC # BLD AUTO: 4.16 X10(6)/MCL (ref 4.2–5.4)
RBC #/AREA URNS AUTO: ABNORMAL /HPF
RBC UR QL AUTO: NEGATIVE
RV TB RVSP: 5 MMHG
SAMPLE: ABNORMAL
SODIUM BLD-SCNC: 135 MMOL/L (ref 136–145)
SODIUM SERPL-SCNC: 136 MMOL/L (ref 136–145)
SP GR UR STRIP.AUTO: 1.04 (ref 1–1.03)
SQUAMOUS #/AREA URNS LPF: ABNORMAL /HPF
TDI LATERAL: 0.09 M/S
TDI SEPTAL: 0.05 M/S
TDI: 0.07 M/S
TR MAX PG: 10 MMHG
TRICUSPID ANNULAR PLANE SYSTOLIC EXCURSION: 1.98 CM
TRIGL SERPL-MCNC: 53 MG/DL (ref 37–140)
TROPONIN I SERPL-MCNC: <0.01 NG/ML (ref 0–0.04)
TSH SERPL-ACNC: 1.28 UIU/ML (ref 0.35–4.94)
TV REST PULMONARY ARTERY PRESSURE: 13 MMHG
UROBILINOGEN UR STRIP-ACNC: NORMAL
VLDLC SERPL CALC-MCNC: 11 MG/DL
WBC # SPEC AUTO: 11.52 X10(3)/MCL (ref 4.5–11.5)
WBC #/AREA URNS AUTO: ABNORMAL /HPF
Z-SCORE OF LEFT VENTRICULAR DIMENSION IN END DIASTOLE: -0.49
Z-SCORE OF LEFT VENTRICULAR DIMENSION IN END SYSTOLE: -0.06

## 2024-02-20 PROCEDURE — 84443 ASSAY THYROID STIM HORMONE: CPT | Performed by: NURSE PRACTITIONER

## 2024-02-20 PROCEDURE — 25000003 PHARM REV CODE 250: Performed by: INTERNAL MEDICINE

## 2024-02-20 PROCEDURE — 99285 EMERGENCY DEPT VISIT HI MDM: CPT | Mod: 25

## 2024-02-20 PROCEDURE — 80053 COMPREHEN METABOLIC PANEL: CPT | Performed by: PHYSICIAN ASSISTANT

## 2024-02-20 PROCEDURE — 11000001 HC ACUTE MED/SURG PRIVATE ROOM

## 2024-02-20 PROCEDURE — 87086 URINE CULTURE/COLONY COUNT: CPT | Performed by: PHYSICIAN ASSISTANT

## 2024-02-20 PROCEDURE — 80061 LIPID PANEL: CPT | Performed by: NURSE PRACTITIONER

## 2024-02-20 PROCEDURE — 25000003 PHARM REV CODE 250: Performed by: SPECIALIST

## 2024-02-20 PROCEDURE — 84484 ASSAY OF TROPONIN QUANT: CPT | Performed by: PHYSICIAN ASSISTANT

## 2024-02-20 PROCEDURE — 21400001 HC TELEMETRY ROOM

## 2024-02-20 PROCEDURE — 81001 URINALYSIS AUTO W/SCOPE: CPT | Performed by: PHYSICIAN ASSISTANT

## 2024-02-20 PROCEDURE — 99223 1ST HOSP IP/OBS HIGH 75: CPT | Mod: ,,, | Performed by: SPECIALIST

## 2024-02-20 PROCEDURE — 85025 COMPLETE CBC W/AUTO DIFF WBC: CPT | Performed by: PHYSICIAN ASSISTANT

## 2024-02-20 PROCEDURE — 93005 ELECTROCARDIOGRAM TRACING: CPT

## 2024-02-20 PROCEDURE — 92523 SPEECH SOUND LANG COMPREHEN: CPT

## 2024-02-20 PROCEDURE — 97162 PT EVAL MOD COMPLEX 30 MIN: CPT

## 2024-02-20 PROCEDURE — 93010 ELECTROCARDIOGRAM REPORT: CPT | Mod: ,,, | Performed by: INTERNAL MEDICINE

## 2024-02-20 PROCEDURE — 63600175 PHARM REV CODE 636 W HCPCS: Performed by: INTERNAL MEDICINE

## 2024-02-20 RX ORDER — FLECAINIDE ACETATE 100 MG/1
100 TABLET ORAL 2 TIMES DAILY
Status: DISCONTINUED | OUTPATIENT
Start: 2024-02-20 | End: 2024-02-21 | Stop reason: HOSPADM

## 2024-02-20 RX ORDER — ATORVASTATIN CALCIUM 40 MG/1
40 TABLET, FILM COATED ORAL NIGHTLY
Status: DISCONTINUED | OUTPATIENT
Start: 2024-02-20 | End: 2024-02-21 | Stop reason: HOSPADM

## 2024-02-20 RX ORDER — BISACODYL 10 MG/1
10 SUPPOSITORY RECTAL DAILY PRN
Status: DISCONTINUED | OUTPATIENT
Start: 2024-02-20 | End: 2024-02-21 | Stop reason: HOSPADM

## 2024-02-20 RX ORDER — CLOPIDOGREL BISULFATE 75 MG/1
75 TABLET ORAL DAILY
Status: DISCONTINUED | OUTPATIENT
Start: 2024-02-20 | End: 2024-02-21 | Stop reason: HOSPADM

## 2024-02-20 RX ORDER — LORAZEPAM 1 MG/1
2 TABLET ORAL ONCE
Status: COMPLETED | OUTPATIENT
Start: 2024-02-20 | End: 2024-02-20

## 2024-02-20 RX ORDER — LABETALOL HYDROCHLORIDE 5 MG/ML
10 INJECTION, SOLUTION INTRAVENOUS EVERY 6 HOURS PRN
Status: DISCONTINUED | OUTPATIENT
Start: 2024-02-20 | End: 2024-02-21 | Stop reason: HOSPADM

## 2024-02-20 RX ORDER — EZETIMIBE 10 MG/1
10 TABLET ORAL NIGHTLY
Status: DISCONTINUED | OUTPATIENT
Start: 2024-02-20 | End: 2024-02-21 | Stop reason: HOSPADM

## 2024-02-20 RX ADMIN — CLOPIDOGREL BISULFATE 75 MG: 75 TABLET ORAL at 01:02

## 2024-02-20 RX ADMIN — EZETIMIBE 10 MG: 10 TABLET ORAL at 09:02

## 2024-02-20 RX ADMIN — LORAZEPAM 2 MG: 1 TABLET ORAL at 02:02

## 2024-02-20 RX ADMIN — FLECAINIDE ACETATE 100 MG: 100 TABLET ORAL at 09:02

## 2024-02-20 RX ADMIN — CEFTRIAXONE SODIUM 1 G: 1 INJECTION, POWDER, FOR SOLUTION INTRAMUSCULAR; INTRAVENOUS at 03:02

## 2024-02-20 RX ADMIN — ATORVASTATIN CALCIUM 40 MG: 40 TABLET, FILM COATED ORAL at 09:02

## 2024-02-20 NOTE — Clinical Note
The site was marked. The chest was prepped. The site was prepped with ChloraPrep. The patient was draped.

## 2024-02-20 NOTE — PLAN OF CARE
Problem: Physical Therapy  Goal: Physical Therapy Goal  Description: Pt will be seen for the following goals  1. Pt will perform supine to sit and sit to supine with romero  2. Assess transfers and gait  Outcome: Ongoing, Progressing

## 2024-02-20 NOTE — NURSING
Admit questions answered by pt and family member, no need for further questions. Pt states she is not comfortable being unwell, slight hospital anxiety. Pt states she has slight hearing difficulty, otherwise independent at home, fully mobile prior to admit. Pt states she started taking semaglutide within the past week to help with weight loss, which has caused a decrease in appetite. Pt states she does not have an advanced directive, though her daughter (Yolette Cameron) is able to make decisions for her. Med rec completed to the best of pt's knowledge, pt states she never started taking fosamax. No further concerns, pt resting comfortably in bed.

## 2024-02-20 NOTE — CONSULTS
[facility] - Emergency Dept  Neurology  Consult Note      Shelli Flores is a 78 y.o. female who presented to [facility] on 2/20/2024 with reports of  left sided weakness . Onset of symptoms was  unclear; last known normal was 2/19/24 at 2115 (prior to going to sleep) . Stroke risk factors include hyperlipidemia and hypertension. Prior stroke history: none.     Patient woke up at 4:30 am with left leg weakness.  Stated she went back to sleep and later noticed left arm was weak around 8am.  She contacted her daughter (NP for [facility]) and was given a full dose ASA and brought to ED for further evaluation.  Stroke alert was activated upon arrival to ED.    Daughter states patient was previously on ASA but stopped due to excessive bruising.      Past Medical History:   Diagnosis Date    Hyperlipidemia     Hypertension     V-tach      Past Surgical History:   Procedure Laterality Date    HYSTERECTOMY      skin cancer removal       Family History   Problem Relation Age of Onset    Breast cancer Mother     Hypertension Father     Heart disease Father      Social History     Tobacco Use    Smoking status: Never     Passive exposure: Never    Smokeless tobacco: Never   Substance Use Topics    Alcohol use: Yes     Alcohol/week: 4.0 standard drinks of alcohol     Types: 4 Glasses of wine per week    Drug use: Never      Review of patient's allergies indicates:  No Known Allergies      STROKE DOCUMENTATION   Acute Stroke Times   Last Known Normal Date: 02/19/24  Last Known Normal Time: 2115  Stroke Team Called Date: 02/20/24  Stroke Team Called Time: 1036  Stroke Team Arrival Date: 02/20/24  Stroke Team Arrival Time: 1039  Thrombolytic Therapy Recommended: No (OOW for TNK)    NIH Scale:  1a. Level of Consciousness: 0-->Alert, keenly responsive  1b. LOC Questions: 0-->Answers both questions correctly  1c. LOC Commands: 0-->Performs both tasks correctly  2. Best Gaze: 0-->Normal  3. Visual: 0-->No visual loss  4. Facial  Palsy: 0-->Normal symmetrical movements  5a. Motor Arm, Left: 1-->Drift, limb holds 90 (or 45) degrees, but drifts down before full 10 seconds, does not hit bed or other support  5b. Motor Arm, Right: 0-->No drift, limb holds 90 (or 45) degrees for full 10 secs  6a. Motor Leg, Left: 1-->Drift, leg falls by the end of the 5-sec period but does not hit bed  6b. Motor Leg, Right: 0-->No drift, leg holds 30 degree position for full 5 secs  7. Limb Ataxia: 1-->Present in one limb (LUE ataxia)  8. Sensory: 0-->Normal, no sensory loss  9. Best Language: 0-->No aphasia, normal  10. Dysarthria: 0-->Normal  11. Extinction and Inattention (formerly Neglect): 0-->No abnormality  Total (NIH Stroke Scale): 3       Neurological Exam:   LOC: alert and follows requests  Language: No aphasia  Speech: No dysarthria  Visual Fields (CN II): Full  EOM (CN III, IV, VI): Full/intact  Pupils (CN III, IV, VI): PERRL  Facial Movement (CN VII): symmetric facial expression  Motor*: LUE 4/5, LLE 4/5, RUE 5/5, RLE 5/5  Cerebellar*: Finger/Nose Abnormal - left upper  Sensation: intact to light touch, temperature and vibration      Laboratory:  BMP:   Lab Results   Component Value Date    GLUCOSE 109 02/20/2024     02/20/2024    K 3.7 02/20/2024    CO2 26 02/20/2024    BUN 13.9 02/20/2024    CREATININE 0.79 02/20/2024    CALCIUM 9.8 02/20/2024     CBC:   Lab Results   Component Value Date    WBC 11.52 (H) 02/20/2024    RBC 4.16 (L) 02/20/2024    HGB 13.6 02/20/2024    HCT 40.6 02/20/2024    HCT 43 02/20/2024     02/20/2024    MCV 97.6 (H) 02/20/2024    MCH 32.7 (H) 02/20/2024    MCHC 33.5 02/20/2024       Diagnostic Results  Brain Imaging:   -CTh:   1. No acute intracranial abnormality.  2. Chronic microvascular ischemic changes.     Cerebrovascular Imaging:   -CTA h/n: No large vessel occlusion or flow-limiting stenosis.        Discussed with Dr Metzger at 1047 ... Not a candidate for TNK  (OOW). Will get stat CTA  h/n.    Thrombolysis Candidate? No, Out of window - Symptom onset > 4.5 hours  -Delays to Thrombolysis?  Not Applicable    Interventional Revascularization Candidate? No; No large vessel occlusion identified on imaging   -Delays to Thrombectomy? Not Applicable      PLAN:  Left sided weakness    OOW for TNK.  No LVO on CTA.    -Recommend admission for stroke workup.  -Allow permissive HTN ... prn hydralazine and labetalol for SBP > 220 or DBP > 120     - after 24 hours from symptom onset, ok to normalize blood pressure  -Neuro checks q4hr ... stat CTh if any neuro change   -Begin ASA 325mg daily .... if failed Glen, then ASA 300mg SD daily  -DVT ppx with SCD or lovenox 40 s/c daily  -Continuous telemetry monitoring  -Bedrest and HOB flat for 24 hours  -NPO until passes Glen or cleared by SLP  -PT/OT/SLP to evaluate after 24 hour bedrest completed (from symptom onset)      Thank you for your consult.   Further recommendations to follow by MD.      1300:  Rounded with Dr Metzger  Will start Plavix 75mg daily ... Patient reported she was previously on ASA but stopped 2/2 excessive bruising  Continue Atorvastatin 40mg daily  Ok to sit up and have diet order per Dr Metzger ... Patient passed glen Bowles, SERGIO  Neurology  [facility] - Emergency Dept

## 2024-02-20 NOTE — H&P
[facility]  Hospital Medicine History & Physical Examination       Patient Name: Shelli Flores  MRN: [id number]  Patient Class: IP- Inpatient   Admission Date: 02/20/2024   Admitting Service: Hospital Medicine   Length of Stay: 0  Attending Physician: Dawit Ramachandran MD  Primary Care Provider: Eladio Porter MD  Face-to-Face encounter date: 02/20/2024  Code Status:   Chief Complaint: Extremity Weakness (Pt. Reports L lower extremity weakness since 0400 today, and L upper extremity weakness since 0800 today. . )      Present at Bedside:  Patient information was obtained from patient, patient's family, past medical records and ER records.      HISTORY OF PRESENT ILLNESS:   Shelli Flores is a 78 y.o. female with a PMHx of HTN, HLD, BREN, CAD, history of V-tach who presented to [facility] on 2/20/2024 with c/o left lower extremity weakness that began around 4:00 a.m. and left upper extremity weakness that began around 8:00 a.m. on the morning of presentation. Patient then contacted her daughter who is a healthcare provider, who gave the patient full-dose ASA and presented to ED for further evaluation. Initial NIH 3. Code FAST activated and evaluated by neurology in ED, not a candidate for TNK.     Upon presentation to ED, vital signed included /80, HR 85, RR 20, SpO2 96% on room air, temperature 98.1° F.  Labs notable for WBC 11.52.  Urinalysis notable for 2+ nitrites, 75 leukocyte esterase, 11-20 WBCs, moderate bacteria and trace mucus.  Urine culture pending.  CT head negative for acute intracranial abnormality, chronic microvascular ischemic changes noted.  CTA head/neck negative for LVO or flow-limiting stenosis.  Admitted to hospital medicine services for further CVA workup.      REVIEW OF SYSTEMS:   Except as documented, all other systems reviewed and negative     PAST MEDICAL HISTORY:   Essential hypertension  Hyperlipidemia   BREN  CAD  Hx of Vtach     PAST SURGICAL  HISTORY:   Hysterectomy   Skin cancer excision    FAMILY HISTORY:   Mother: Breast cancer   Father:  CAD, HTN    SOCIAL HISTORY:   Denied tobacco or illicit drug use.  Drinks 4 glasses of wine per week.    ALLERGIES:   Patient has no known allergies.    HOME MEDICATIONS:     Prior to Admission medications    Medication Sig Start Date End Date Taking? Authorizing Provider   alendronate (FOSAMAX) 70 MG tablet PLEASE SEE ATTACHED FOR DETAILED DIRECTIONS 4/18/23   Provider, Historical   ciprofloxacin HCl (CIPRO) 500 MG tablet Take 500 mg by mouth 2 (two) times daily. 9/21/22   Provider, Historical   ezetimibe (ZETIA) 10 mg tablet Take 10 mg by mouth every evening. 7/22/22   Provider, Historical   flecainide (TAMBOCOR) 100 MG Tab Take 100 mg by mouth 2 (two) times daily. 7/31/22   Provider, Historical   lisinopriL-hydrochlorothiazide (PRINZIDE,ZESTORETIC) 20-25 mg Tab Take 1 tablet by mouth once daily. 8/1/22   Provider, Historical   rosuvastatin (CRESTOR) 20 MG tablet Take 20 mg by mouth once daily. 8/1/22   Provider, Historical     ________________________________________________________________________  INPATIENT LIST OF MEDICATIONS     Current Facility-Administered Medications:     bisacodyL suppository 10 mg, 10 mg, Rectal, Daily PRN, Danna Stacy AGACNP-BC    labetaloL injection 10 mg, 10 mg, Intravenous, Q6H PRN, Danna Stacy AGACNP-BC    Current Outpatient Medications:     alendronate (FOSAMAX) 70 MG tablet, PLEASE SEE ATTACHED FOR DETAILED DIRECTIONS, Disp: , Rfl:     ciprofloxacin HCl (CIPRO) 500 MG tablet, Take 500 mg by mouth 2 (two) times daily., Disp: , Rfl:     ezetimibe (ZETIA) 10 mg tablet, Take 10 mg by mouth every evening., Disp: , Rfl:     flecainide (TAMBOCOR) 100 MG Tab, Take 100 mg by mouth 2 (two) times daily., Disp: , Rfl:     lisinopriL-hydrochlorothiazide (PRINZIDE,ZESTORETIC) 20-25 mg Tab, Take 1 tablet by mouth once daily., Disp: , Rfl:     rosuvastatin (CRESTOR) 20 MG tablet, Take  20 mg by mouth once daily., Disp: , Rfl:     Scheduled Meds:  Continuous Infusions:  PRN Meds:.bisacodyL, labetalol    PHYSICAL EXAM:     VITAL SIGNS: 24 HRS MIN & MAX LAST   Temp  Min: 98.1 °F (36.7 °C)  Max: 98.1 °F (36.7 °C) 98.1 °F (36.7 °C)   BP  Min: 166/88  Max: 166/88 (!) 166/88   Pulse  Min: 85  Max: 90  90   Resp  Min: 20  Max: 20 20   SpO2  Min: 96 %  Max: 96 % 96 %       General appearance: Well-developed female in no apparent distress.  HENT: Atraumatic head. Moist mucous membranes of oral cavity.  Eyes: Normal extraocular movements.   Neck: Supple.   Lungs: Clear to auscultation bilaterally.   Heart: Regular rate and rhythm. S1 and S2 present. No pedal edema.  Abdomen: Soft, non-distended, non-tender.  Extremities: No cyanosis, clubbing  Skin: No Rash.   Neuro: CN grossly intact. LUE and LLE weakness 3-4/5  Psych/mental status: Appropriate mood and affect. Responds appropriately to questions.     LABS AND IMAGING:     Recent Labs   Lab 02/20/24  1058 02/20/24  1105   WBC  --  11.52*   RBC  --  4.16*   HGB  --  13.6   HCT 43 40.6   MCV  --  97.6*   MCH  --  32.7*   MCHC  --  33.5   RDW  --  13.9   PLT  --  260   MPV  --  9.6       Recent Labs   Lab 02/20/24  1105      K 3.7   CO2 26   BUN 13.9   CREATININE 0.79   CALCIUM 9.8   ALBUMIN 3.8   ALKPHOS 69   ALT 18   AST 23   BILITOT 0.4       Microbiology Results (last 7 days)       ** No results found for the last 168 hours. **             CTA STROKE MULTI-PHASE  Narrative: EXAMINATION:  CTA STROKE MULTI-PHASE    TECHNIQUE:  Axial images obtained through the cervical region and [place] before and after the administration of intravenous contrast.    Coronal, sagittal, MIP and 3D reconstructions were obtained from the axial data.    Automatic exposure control was utilized to limit radiation dose.    Radiation Dose:    Total DLP: 3054 mGy*cm    COMPARISON:  CT head dated 02/20/2024    FINDINGS:  Head CT with contrast:    No interval changes  when compared to the previous CT.    No enhancing abnormalities.    If present, stenosis of the carotid bulbs is measured based on NASCET criteria,    i.e. area of maximal stenosis compared to the cervical ICA distal to the bulb.    Cervical CTA:    The origins of the great vessels are patent.    The common carotid arteries, carotid bulbs and internal carotid arteries are patent.  There are mild calcifications at the carotid bulbs without hemodynamically significant stenosis.    The vertebral arteries are patent.    Intracranial CTA:    There are mild calcifications the course the carotid siphons without hemodynamically significant stenosis.  The middle cerebral arteries and anterior arteries are patent.    The right vertebral artery is hypoplastic with a dominant PICA branch.  The left vertebral artery, basilar artery and posterior cerebral arteries are patent.    The dural venous sinuses are patent.  Impression: No large vessel occlusion or flow-limiting stenosis.    Electronically signed by: Liyah Barrett  Date:    02/20/2024  Time:    11:53  CT HEAD FOR STROKE  Narrative: EXAMINATION:  CT HEAD FOR STROKE    CLINICAL HISTORY:  code fast;    TECHNIQUE:  Axial scans were obtained from skull base to the vertex.    Coronal and sagittal reconstructions obtained from the axial data.    Automatic exposure control was utilized to limit radiation dose.    Contrast: None    Radiation Dose:    Total DLP: 10 70 mGy*cm    COMPARISON:  None    FINDINGS:  There is no acute intracranial hemorrhage or edema. The gray-white matter differentiation is preserved.  Patchy hypodensities in the subcortical and periventricular white matter likely represent chronic microvascular ischemic changes.    There is no mass effect or midline shift.  There is diffuse parenchymal volume loss.  The basal cisterns are patent. There is no abnormal extra-axial fluid collection.  Carotid artery calcifications are noted.    The calvarium and skull  base are intact.  There is fluid layering in the right maxillary sinus.  Impression: 1. No acute intracranial abnormality.  2. Chronic microvascular ischemic changes.  Code fast findings given to NPJelena at the time of dictation.    Electronically signed by: Liyah Barrett  Date:    02/20/2024  Time:    11:05        ASSESSMENT & PLAN:     Suspected acute CVA   Left-sided weakness   Acute bacterial cystitis, POA  Leukocytosis  Essential hypertension  Hyperlipidemia   History of BREN, CAD, V-tach     Plan:   Neurology consulted, follow up with recommendations  MRI brain, B Carotid US and ECHO pending  Lipid panel, hemoglobin A1c  pending  Hold antihypertensives to allow for permissive hypertension  PRN labetalol as needed for SBP greater than 220 or DBP greater than 100  PT/OT/SLP to evaluate and treat when appropriate  Neuro checks every 4 hours  Fall precautions  Resume other appropriate home medications  Labs in AM       VTE Prophylaxis: SCDs    Discharge Planning and Disposition: TBD    I, Danna Stacy NP have reviewed and discussed the case with Dawit Ramachandran MD  Please see the attending MD's addendum for further assessment and plan.    Danna Stacy, [facility]  Department of Hospital Medicine   [facility]   02/20/2024    _______________________________________________________________________________  MD Addendum:      Into 8-year-old  woman with above-mentioned medical history presented after complaints of acute onset left lower extremity weakness, followed by left upper extremity weakness, ataxia due to same.  At baseline she is independent with ADLs and IADLs, does not smoke and practice healthy lifestyle.  She was recently started on semaglutide for weight loss.  At presentation blood pressure was elevated as with the she was hemodynamically stable.  Labs revealed mild leukocytosis, stable electrolytes, normal troponin and TSH.  UA was suspicious for  UTI and sample was sent for cultures.  CT head showed chronic changes with no acute changes.  And CTA showed no major vascular stenosis.  She was loaded with aspirin and admitted to hospital medicine service.  When seen at bedside she was alert, oriented, comfortably resting.  Continues to have left upper and lower extremity weakness 3-4/5.   and daughter were at bedside.  Rest of the exam was unremarkable.  She reports bruising with aspirin and she takes flecainide at home for V-tach.  Agree with the HPI, examined, plan mentioned above.  Unclear etiology for stroke at this moment.  We will complete stroke protocol.  Await MRI.  Neurology following for further input.  Therapy as tolerated and she will need some form of rehab.  We will review and renew other appropriate home medications.  Recommended to stop Semaglutide.  Alert pharmacy hypertension for today.  Will add ceftriaxone and follow urine cultures until finalized.      02/20/2024

## 2024-02-20 NOTE — FIRST PROVIDER EVALUATION
Medical screening examination initiated.  I have conducted a focused provider triage encounter, findings are as follows:    No chief complaint on file.      Brief history of present illness:  78 y.o. female presents to the E.D. with c/o left sided weakness that started at 0430 this morning.     There were no vitals filed for this visit.    Pertinent physical exam:  Awake, Alert, Oriented, Non labored breathing, drift noted of left upper extremity    Brief workup plan:  code fast, ct head, labs, ua EKG     Preliminary workup initiated; this workup will be continued and followed by the physician or advanced practice provider that is assigned to the patient when roomed.

## 2024-02-20 NOTE — ED PROVIDER NOTES
Encounter Date: 2/20/2024    SCRIBE #1 NOTE: I, Lizzette Quezada, am scribing for, and in the presence of,  Sis Valdez MD. I have scribed the following portions of the note - Other sections scribed: HPI, ROS, PE.       History     Chief Complaint   Patient presents with    Extremity Weakness     Pt. Reports L lower extremity weakness since 0400 today, and L upper extremity weakness since 0800 today. .      78 year old female with a pmhx of V-tach, HLD, and HTN presents to the ED for left sided weakness onset around 0430 this morning.  The patient's daughter reports that she woke up around 0430 this morning feeling weak in her left leg, but assumed she was just tired so she went back to sleep.  The patient's daughter reports that she then woke up at 0800 this morning feeling weak in her left arm.  The patient's daughter states that she gave her a full dose of Aspirin PTA.  The patient's daughter denies the patient having any previous strokes, or being on blood thinners.  The patient states that she knows the current month, as well as her name and age.    The history is provided by the patient and a relative. No  was used.   Illness   The current episode started 3 to 5 hours ago. Pertinent negatives include no headaches.     Review of patient's allergies indicates:  No Known Allergies  Past Medical History:   Diagnosis Date    Hyperlipidemia     Hypertension     V-tach      Past Surgical History:   Procedure Laterality Date    HYSTERECTOMY                skin cancer removal       Family History   Problem Relation Age of Onset    Breast cancer Mother     Hypertension Father     Heart disease Father      Social History     Tobacco Use    Smoking status: Never     Passive exposure: Never    Smokeless tobacco: Never   Substance Use Topics    Alcohol use: Yes     Alcohol/week: 4.0 standard drinks of alcohol     Types: 4 Glasses of wine per week    Drug use: Never     Review of Systems    Neurological:  Positive for weakness (L sided). Negative for syncope and headaches.       Physical Exam     Initial Vitals [02/20/24 1030]   BP Pulse Resp Temp SpO2   (!) 166/88 85 20 98.1 °F (36.7 °C) 96 %      MAP       --         Physical Exam    Nursing note and vitals reviewed.  Constitutional: She appears well-developed and well-nourished. No distress.   HENT:   Head: Normocephalic and atraumatic.   Eyes: EOM are normal. Pupils are equal, round, and reactive to light.   Neck:   Normal range of motion.  Cardiovascular:  Normal rate, regular rhythm, normal heart sounds and intact distal pulses.           Pulmonary/Chest: Breath sounds normal. She has no wheezes. She has no rhonchi. She has no rales.   Abdominal: Abdomen is soft. Bowel sounds are normal. She exhibits no distension. There is no abdominal tenderness.   Musculoskeletal:         General: Normal range of motion.      Cervical back: Normal range of motion.     Neurological: She is alert.   Left upper extremity drift, 4/5 strength hand   LLE 4/5 RLE 5/5  Impaired LUE finger-nose-finger,   No ataxia RUE or BLE  No facial droop  Normal speech   Skin: Skin is warm and dry. Capillary refill takes less than 2 seconds.         ED Course   Procedures  Labs Reviewed   CBC WITH DIFFERENTIAL - Abnormal; Notable for the following components:       Result Value    WBC 11.52 (*)     RBC 4.16 (*)     MCV 97.6 (*)     MCH 32.7 (*)     All other components within normal limits   URINALYSIS, REFLEX TO URINE CULTURE - Abnormal; Notable for the following components:    Specific Gravity, UA 1.039 (*)     Nitrites, UA 2+ (*)     Leukocyte Esterase, UA 75 (*)     WBC, UA 11-20 (*)     Bacteria, UA Moderate (*)     Mucous, UA Trace (*)     All other components within normal limits   LIPID PANEL - Abnormal; Notable for the following components:    HDL Cholesterol 65 (*)     All other components within normal limits   ISTAT CHEM8 - Abnormal; Notable for the following  components:    POC Glucose 115 (*)     POC Sodium 135 (*)     All other components within normal limits   POCT GLUCOSE - Abnormal; Notable for the following components:    POCT Glucose 127 (*)     All other components within normal limits   TROPONIN I - Normal   TSH - Normal   CBC W/ AUTO DIFFERENTIAL    Narrative:     The following orders were created for panel order CBC auto differential.  Procedure                               Abnormality         Status                     ---------                               -----------         ------                     CBC with Differential[[id number]]       Abnormal            Final result                 Please view results for these tests on the individual orders.   COMPREHENSIVE METABOLIC PANEL     EKG Readings: (Independently Interpreted)   Initial Reading: No STEMI. Rhythm: Normal Sinus Rhythm. Heart Rate: 70. Conduction: RBBB and LAFB.   02/20/2024 @ 1150     ECG Results              EKG 12-lead (Final result)        Collection Time Result Time QRS Duration OHS QTC Calculation    02/20/24 11:50:13 02/22/24 11:19:20 112 490                     Final result by Interface, Lab In [facility] (02/22/24 11:19:26)                   Narrative:    Test Reason : I63.9,    Vent. Rate : 070 BPM     Atrial Rate : 070 BPM     P-R Int : 190 ms          QRS Dur : 112 ms      QT Int : 454 ms       P-R-T Axes : 083 -69 071 degrees     QTc Int : 490 ms    Normal sinus rhythm  Pulmonary disease pattern  Right bundle branch block  Left anterior fascicular block   Bifascicular block   Abnormal ECG  Confirmed by Kevin Sorto MD ([id number]) on 2/22/2024 11:19:19 AM    Referred By: [email or web address]   SELF           Confirmed By:Kevin Sorto MD                                     EKG 12-LEAD (Final result)  Result time 02/26/24 11:55:41      Final result by Unknown User (02/26/24 11:55:41)                                      Imaging Results              MRI Brain Without Contrast (Final result)  Result time  02/20/24 17:36:27      Final result by Meche Venegas MD (02/20/24 17:36:27)                   Impression:      Acute infarct in the right parietal region near the cerebral convexity    Chronic age related changes seen    Right maxillary sinusitis      Electronically signed by: Giovanni Venegas  Date:    02/20/2024  Time:    17:36               Narrative:    EXAMINATION:  MRI BRAIN WITHOUT CONTRAST    CLINICAL HISTORY:  Stroke, follow up;    TECHNIQUE:  Multiplanar multisequence MR imaging of the brain was performed without contrast.    COMPARISON:  CT scan dated 02/20/2024    FINDINGS:  No intracranial mass or lesion is seen.  No hemorrhage is seen.  There is an acute infarct seen in the right parietal region towards the cerebral convexity..  No diffusion abnormality seen.  There is diffuse cerebral atrophy seen along with some compensatory ventricular dilatation and periventricular white matter change consistent with patient's age.  Posterior fossa appears normal.  Calvarium is intact.  There is evidence of right maxillary sinusitis.                                       CTA STROKE MULTI-PHASE (Final result)  Result time 02/20/24 11:53:25      Final result by Liyah Barrett MD (02/20/24 11:53:25)                   Impression:      No large vessel occlusion or flow-limiting stenosis.      Electronically signed by: Liyah Barrett  Date:    02/20/2024  Time:    11:53               Narrative:    EXAMINATION:  CTA STROKE MULTI-PHASE    TECHNIQUE:  Axial images obtained through the cervical region and [place] of [place] before and after the administration of intravenous contrast.    Coronal, sagittal, MIP and 3D reconstructions were obtained from the axial data.    Automatic exposure control was utilized to limit radiation dose.    Radiation Dose:    Total DLP: 3054 mGy*cm    COMPARISON:  CT head dated 02/20/2024    FINDINGS:  Head CT with contrast:    No interval changes when compared to the previous  CT.    No enhancing abnormalities.    If present, stenosis of the carotid bulbs is measured based on NASCET criteria,    i.e. area of maximal stenosis compared to the cervical ICA distal to the bulb.    Cervical CTA:    The origins of the great vessels are patent.    The common carotid arteries, carotid bulbs and internal carotid arteries are patent.  There are mild calcifications at the carotid bulbs without hemodynamically significant stenosis.    The vertebral arteries are patent.    Intracranial CTA:    There are mild calcifications the course the carotid siphons without hemodynamically significant stenosis.  The middle cerebral arteries and anterior arteries are patent.    The right vertebral artery is hypoplastic with a dominant PICA branch.  The left vertebral artery, basilar artery and posterior cerebral arteries are patent.    The dural venous sinuses are patent.                                       CT HEAD FOR STROKE (Final result)  Result time 02/20/24 11:05:48   Procedure changed from CT Head Without Contrast     Final result by Liyah Barrett MD (02/20/24 11:05:48)                   Impression:      1. No acute intracranial abnormality.  2. Chronic microvascular ischemic changes.  Code fast findings given to Jelena TONG at the time of dictation.      Electronically signed by: Liyah Barrett  Date:    02/20/2024  Time:    11:05               Narrative:    EXAMINATION:  CT HEAD FOR STROKE    CLINICAL HISTORY:  code fast;    TECHNIQUE:  Axial scans were obtained from skull base to the vertex.    Coronal and sagittal reconstructions obtained from the axial data.    Automatic exposure control was utilized to limit radiation dose.    Contrast: None    Radiation Dose:    Total DLP: 10 70 mGy*cm    COMPARISON:  None    FINDINGS:  There is no acute intracranial hemorrhage or edema. The gray-white matter differentiation is preserved.  Patchy hypodensities in the subcortical and periventricular white  matter likely represent chronic microvascular ischemic changes.    There is no mass effect or midline shift.  There is diffuse parenchymal volume loss.  The basal cisterns are patent. There is no abnormal extra-axial fluid collection.  Carotid artery calcifications are noted.    The calvarium and skull base are intact.  There is fluid layering in the right maxillary sinus.                                       Medications - No data to display  Medical Decision Making  Problems Addressed:  Left hemiplegia: acute illness or injury  Neurologic deficit due to acute ischemic cerebrovascular accident (CVA): acute illness or injury that poses a threat to life or bodily functions    Risk  Decision regarding hospitalization.      ED assessment:    Ms. Flores presented to the emergency department for evaluation of acute onset left-sided weakness noted upon waking this morning.  Left upper extremity drift noted on my examination, otherwise no focal neurologic deficits.  Activated as a CODE FAST on arrival.    Differential diagnosis (including but not limited to):   Ischemic CVA, hemorrhagic CVA, arrhythmia, cardioembolic disease, hypoglycemia, TONI, electrolyte derangements, carotid stenosis    ED management:   With wake up symptoms and known symptomatic at least 6 hours prior to arrival, not a candidate for thrombolytic therapy. NIHSS and no LVO noted on CTA - no indication for neurointervention. Discussed with neurology who will follow in consultation for suspected CVA/further neuro work up. Discussed with hospitalist who accepts for admission.     My independent radiology interpretation:   CT head w/o contrast: no acute intracranial hemorrhage or mass lesion      Amount and/or Complexity of Data Reviewed  Independent historian: daughter    Summary of history:  The patient's daughter reports that she woke up around 0430 this morning feeling weak in her left leg, but assumed she was just tired so she went back to sleep.  The  patient's daughter reports that she then woke up at 0800 this morning feeling weak in her left arm.  The patient's daughter states that she gave her a full dose of Aspirin PTA.  The patient's daughter denies the patient having any previous strokes, or being on blood thinners.   External data reviewed: notes from previous ED visits  Summary of data reviewed: lst ED visit related to radius fracture 12/2023, no prior CVA/TIA visits.   Risk and benefits of testing: discussed   Labs: ordered and reviewed  Radiology: ordered and independent interpretation performed (see above or ED course)  ECG/medicine tests: ordered and independent interpretation performed (see above or ED course)  Discussion of management or test interpretation with external provider(s): discussed with hospitalist physician and discussed with stroke neurology consultant   Summary of discussion: as above    Risk  Decision regarding hospitalization  Shared decision making     Critical Care  none    ISis MD personally performed the history, PE, MDM, and procedures as documented above and agree with the scribe's documentation.          Scribe Attestation:   Scribe #1: I performed the above scribed service and the documentation accurately describes the services I performed. I attest to the accuracy of the note.    Attending Attestation:           Physician Attestation for Scribe:  Physician Attestation Statement for Scribe #1: I, Sis Valdez MD, reviewed documentation, as scribed by Lizzette Quezada in my presence, and it is both accurate and complete.             ED Course as of 03/15/24 2212   Tue Feb 20, 2024   1159 CT/CTA with no acute findings.  Given new onset focal neurologic deficits, will admit for stroke/TIA workup.  Not a candidate for thrombolytics or neuro intervention at this time. [KS]   1202 Paged hospitalist, Dr. Alo Portillo [MB]   2002 Discussed with Danna Stacy NP with hospital medicine who accepts for admission on  behalf of Dr. Ramachandran [KS]      ED Course User Index  [KS] Sis Valdez MD  [MB] Lizzette Quezada                             Clinical Impression:  Final diagnoses:  [I63.9] Stroke  [I63.9, R29.818] Neurologic deficit due to acute ischemic cerebrovascular accident (CVA) (Primary)  [G81.94] Left hemiplegia          ED Disposition Condition    Admit Stable                Sis Valdez MD  03/15/24 [id number]

## 2024-02-20 NOTE — PT/OT/SLP EVAL
[facility]  Speech Language Pathology Department  Cognitive-Communication Evaluation    Patient Name:  Shelli Flores   MRN:  [id number]    Recommendations:     General recommendations:  SLP intervention not indicated  Communication strategies:  none    Discharge therapy intensity: per OT/PT recs  Barriers to safe discharge: acuity of illness    History:     Shelli Flores is a/n 78 y.o. female admitted with complaints of upper and lower extremity weakness. Pt admitted for CVA workup.    Past Medical History:   Diagnosis Date    Hyperlipidemia     Hypertension     V-tach      Past Surgical History:   Procedure Laterality Date    HYSTERECTOMY      skin cancer removal         Previous level of Function  Occupation: full time job  Lives: with spouse  Handed: Right  Glasses: yes  Hearing Aids: no  Home Responsibilities: drives, financial management, medication/health management, laundry, shopping, meal preparation, and cleaning    Subjective     Patient awake and cooperative.    Patient goals: get better and go home   Spiritual/Cultural/Yazdanism Beliefs/Practices that affect care: no  Pain/Comfort: Pain Rating 1: 0/10  Respiratory Status: Room Air    Objective:     ORAL MUSCULATURE  Dentition: own teeth  Facial Movement: WFL  Buccal Strength & Mobility: WFL  Mandibular Strength & Mobility: WFL  Oral Labial Strength & Mobility: WFL  Lingual Strength & Mobility: WFL    SPEECH PRODUCTION  Phoneme Production: adequate  Voice Quality: adequate  Voice Production: adequate  Speech Rate: appropriate  Loudness: acceptable  Respiration: WFL for speech  Resonance: adequate  Prosody: adequate  Speech Intelligibility  Known Context: Greater that 90%  Unknown Context: Greater that 90%    AUDITORY COMPREHENSION  Identification:  Body parts: Within Functional Limits  Objects: Within Functional Limits  Following Directions:  1-Step: Within Functional Limits  2-Step: Within Functional Limits  3-Step: Within  Functional Limits  Yes/No Questions:  Biographical: Within Functional Limits  Environmental: Within Functional Limits  Simple: Within Functional Limits  Complex: Within Functional Limits    VERBAL EXPRESSION  Automatic Speech:  Functional needs: Within Functional Limits  Days of the week: Within Functional Limits  Months of the year: Within Functional Limits  Counting: Within Functional Limits  Phrase Completion: Within Functional Limits  Confrontation Naming  Objects: Within Functional Limits  Wh- Questions:  Object name: Within Functional Limits  Object function: Within Functional Limits  Complex: Within Functional Limits    COGNITION  Orientation:  Person: yes  Place: yes  Time: yes  Situation: yes   Attention:  Within Functional Limits  Pragmatics:  Within Functional Limits  Memory:  Immediate: Impaired (Mild)  Delayed: Impaired (mild)  Problem Solving  Functional simple: Within Functional Limits  Functional complex: Within Functional Limits  Organization:  Convergent thinking: Within Functional Limits  Divergent thinking: Within Functional Limits  Executive Function:  Within Functional Limits    Assessment:     Pt speech, cognition, and language appear to be at baseline per assessment and patient/family report. SLP to sign off as no skilled services are warranted at this time.     Patient Education:     Daughter was provided with verbal education regarding results of assessment and SLP to sign off.  Understanding was verbalized.    Plan:     SLP Follow-Up:  No      Time Tracking:     SLP Treatment Date:   02/20/24  Speech Start Time:  1520  Speech Stop Time:  1541     Speech Total Time (min):  21 min    Billable minutes:  Evaluation of Speech Sound Production with Comprehension and Expression, 21 minutes     02/20/2024

## 2024-02-20 NOTE — Clinical Note
Diagnosis: Stroke [[id number]]   Future Attending Provider: DAVID SONG [[id number]]   Admit to which facility:: [facility] [[id number]]   Reason for IP Medical Treatment  (Clinical interventions that can only be accomplished in the IP setting? ) :: stroke work up   I certify that Inpatient services for greater than or equal to 2 midnights are medically necessary:: Yes   Plans for Post-Acute care--if anticipated (pick the single best option):: A. No post acute care anticipated at this time

## 2024-02-21 VITALS
RESPIRATION RATE: 18 BRPM | SYSTOLIC BLOOD PRESSURE: 150 MMHG | HEIGHT: 64 IN | HEART RATE: 84 BPM | DIASTOLIC BLOOD PRESSURE: 85 MMHG | WEIGHT: 169 LBS | OXYGEN SATURATION: 94 % | BODY MASS INDEX: 28.85 KG/M2 | TEMPERATURE: 98 F

## 2024-02-21 PROBLEM — I63.9 STROKE: Status: ACTIVE | Noted: 2024-02-21

## 2024-02-21 LAB
ALBUMIN SERPL-MCNC: 3.6 G/DL (ref 3.4–4.8)
ALBUMIN/GLOB SERPL: 1.5 RATIO (ref 1.1–2)
ALP SERPL-CCNC: 63 UNIT/L (ref 40–150)
ALT SERPL-CCNC: 15 UNIT/L (ref 0–55)
APTT PPP: 34.3 SECONDS (ref 23.2–33.7)
AST SERPL-CCNC: 20 UNIT/L (ref 5–34)
BASOPHILS # BLD AUTO: 0.05 X10(3)/MCL
BASOPHILS NFR BLD AUTO: 0.6 %
BILIRUB SERPL-MCNC: 0.4 MG/DL
BUN SERPL-MCNC: 8 MG/DL (ref 9.8–20.1)
CALCIUM SERPL-MCNC: 9.5 MG/DL (ref 8.4–10.2)
CHLORIDE SERPL-SCNC: 103 MMOL/L (ref 98–107)
CK MB SERPL-MCNC: 1.2 NG/ML
CO2 SERPL-SCNC: 24 MMOL/L (ref 23–31)
CREAT SERPL-MCNC: 0.7 MG/DL (ref 0.55–1.02)
EOSINOPHIL # BLD AUTO: 0.24 X10(3)/MCL (ref 0–0.9)
EOSINOPHIL NFR BLD AUTO: 3 %
ERYTHROCYTE [DISTWIDTH] IN BLOOD BY AUTOMATED COUNT: 13.9 % (ref 11.5–17)
EST. AVERAGE GLUCOSE BLD GHB EST-MCNC: 131.2 MG/DL
GFR SERPLBLD CREATININE-BSD FMLA CKD-EPI: >60 MLS/MIN/1.73/M2
GLOBULIN SER-MCNC: 2.4 GM/DL (ref 2.4–3.5)
GLUCOSE SERPL-MCNC: 90 MG/DL (ref 82–115)
HBA1C MFR BLD: 6.2 %
HCT VFR BLD AUTO: 40.2 % (ref 37–47)
HGB BLD-MCNC: 13.5 G/DL (ref 12–16)
IMM GRANULOCYTES # BLD AUTO: 0.02 X10(3)/MCL (ref 0–0.04)
IMM GRANULOCYTES NFR BLD AUTO: 0.2 %
INR PPP: 1
LEFT CCA DIST DIAS: 23 CM/S
LEFT CCA DIST SYS: 90 CM/S
LEFT CCA PROX DIAS: 22 CM/S
LEFT CCA PROX SYS: 106 CM/S
LEFT ECA DIAS: 14 CM/S
LEFT ECA SYS: 78 CM/S
LEFT ICA DIST DIAS: 25 CM/S
LEFT ICA DIST SYS: 88 CM/S
LEFT ICA MID DIAS: 21 CM/S
LEFT ICA MID SYS: 73 CM/S
LEFT ICA PROX DIAS: 12 CM/S
LEFT ICA PROX SYS: 78 CM/S
LEFT VERTEBRAL DIAS: 10 CM/S
LEFT VERTEBRAL SYS: 41 CM/S
LYMPHOCYTES # BLD AUTO: 2.22 X10(3)/MCL (ref 0.6–4.6)
LYMPHOCYTES NFR BLD AUTO: 27.6 %
MAGNESIUM SERPL-MCNC: 2 MG/DL (ref 1.6–2.6)
MCH RBC QN AUTO: 32.7 PG (ref 27–31)
MCHC RBC AUTO-ENTMCNC: 33.6 G/DL (ref 33–36)
MCV RBC AUTO: 97.3 FL (ref 80–94)
MONOCYTES # BLD AUTO: 0.97 X10(3)/MCL (ref 0.1–1.3)
MONOCYTES NFR BLD AUTO: 12 %
NEUTROPHILS # BLD AUTO: 4.55 X10(3)/MCL (ref 2.1–9.2)
NEUTROPHILS NFR BLD AUTO: 56.6 %
NRBC BLD AUTO-RTO: 0 %
OHS CV CAROTID RIGHT ICA EDV HIGHEST: 17
OHS CV CAROTID ULTRASOUND LEFT ICA/CCA RATIO: 0.98
OHS CV CAROTID ULTRASOUND RIGHT ICA/CCA RATIO: 1.04
OHS CV PV CAROTID LEFT HIGHEST CCA: 106
OHS CV PV CAROTID LEFT HIGHEST ICA: 88
OHS CV PV CAROTID RIGHT HIGHEST CCA: 71
OHS CV PV CAROTID RIGHT HIGHEST ICA: 74
OHS CV US CAROTID LEFT HIGHEST EDV: 25
PHOSPHATE SERPL-MCNC: 3.6 MG/DL (ref 2.3–4.7)
PLATELET # BLD AUTO: 241 X10(3)/MCL (ref 130–400)
PMV BLD AUTO: 9.7 FL (ref 7.4–10.4)
POTASSIUM SERPL-SCNC: 3.4 MMOL/L (ref 3.5–5.1)
PROT SERPL-MCNC: 6 GM/DL (ref 5.8–7.6)
PROTHROMBIN TIME: 13.2 SECONDS (ref 12.5–14.5)
RBC # BLD AUTO: 4.13 X10(6)/MCL (ref 4.2–5.4)
RIGHT CCA DIST DIAS: 19 CM/S
RIGHT CCA DIST SYS: 71 CM/S
RIGHT CCA PROX DIAS: 12 CM/S
RIGHT CCA PROX SYS: 68 CM/S
RIGHT ECA DIAS: 0 CM/S
RIGHT ECA SYS: 81 CM/S
RIGHT ICA DIST DIAS: 17 CM/S
RIGHT ICA DIST SYS: 74 CM/S
RIGHT ICA MID DIAS: 15 CM/S
RIGHT ICA MID SYS: 60 CM/S
RIGHT ICA PROX DIAS: 5 CM/S
RIGHT ICA PROX SYS: 56 CM/S
RIGHT VERTEBRAL DIAS: 4 CM/S
RIGHT VERTEBRAL SYS: 30 CM/S
SODIUM SERPL-SCNC: 137 MMOL/L (ref 136–145)
TROPONIN I SERPL-MCNC: <0.01 NG/ML (ref 0–0.04)
WBC # SPEC AUTO: 8.05 X10(3)/MCL (ref 4.5–11.5)

## 2024-02-21 PROCEDURE — 83036 HEMOGLOBIN GLYCOSYLATED A1C: CPT | Performed by: NURSE PRACTITIONER

## 2024-02-21 PROCEDURE — 25000003 PHARM REV CODE 250: Performed by: INTERNAL MEDICINE

## 2024-02-21 PROCEDURE — 84100 ASSAY OF PHOSPHORUS: CPT | Performed by: NURSE PRACTITIONER

## 2024-02-21 PROCEDURE — 83735 ASSAY OF MAGNESIUM: CPT | Performed by: NURSE PRACTITIONER

## 2024-02-21 PROCEDURE — C1764 EVENT RECORDER, CARDIAC: HCPCS | Performed by: INTERNAL MEDICINE

## 2024-02-21 PROCEDURE — 25000003 PHARM REV CODE 250: Performed by: SPECIALIST

## 2024-02-21 PROCEDURE — 99232 SBSQ HOSP IP/OBS MODERATE 35: CPT | Mod: ,,, | Performed by: SPECIALIST

## 2024-02-21 PROCEDURE — 82553 CREATINE MB FRACTION: CPT | Performed by: NURSE PRACTITIONER

## 2024-02-21 PROCEDURE — 85025 COMPLETE CBC W/AUTO DIFF WBC: CPT | Performed by: NURSE PRACTITIONER

## 2024-02-21 PROCEDURE — 85730 THROMBOPLASTIN TIME PARTIAL: CPT | Performed by: NURSE PRACTITIONER

## 2024-02-21 PROCEDURE — 97116 GAIT TRAINING THERAPY: CPT

## 2024-02-21 PROCEDURE — 0JH632Z INSERTION OF MONITORING DEVICE INTO CHEST SUBCUTANEOUS TISSUE AND FASCIA, PERCUTANEOUS APPROACH: ICD-10-PCS | Performed by: INTERNAL MEDICINE

## 2024-02-21 PROCEDURE — 85610 PROTHROMBIN TIME: CPT | Performed by: NURSE PRACTITIONER

## 2024-02-21 PROCEDURE — 33285 INSJ SUBQ CAR RHYTHM MNTR: CPT | Performed by: INTERNAL MEDICINE

## 2024-02-21 PROCEDURE — 84484 ASSAY OF TROPONIN QUANT: CPT | Performed by: NURSE PRACTITIONER

## 2024-02-21 PROCEDURE — 97166 OT EVAL MOD COMPLEX 45 MIN: CPT

## 2024-02-21 PROCEDURE — 80053 COMPREHEN METABOLIC PANEL: CPT | Performed by: NURSE PRACTITIONER

## 2024-02-21 DEVICE — LUX-DX™ INSERTABLE CARDIAC MONITOR
Type: IMPLANTABLE DEVICE | Site: CHEST  WALL | Status: FUNCTIONAL
Brand: LUX-DX™ INSERTABLE CARDIAC MONITOR

## 2024-02-21 RX ORDER — FLUTICASONE PROPIONATE 50 MCG
2 SPRAY, SUSPENSION (ML) NASAL DAILY
Status: DISCONTINUED | OUTPATIENT
Start: 2024-02-21 | End: 2024-02-21 | Stop reason: HOSPADM

## 2024-02-21 RX ORDER — LIDOCAINE HYDROCHLORIDE 10 MG/ML
INJECTION INFILTRATION; PERINEURAL
Status: DISCONTINUED | OUTPATIENT
Start: 2024-02-21 | End: 2024-02-21 | Stop reason: HOSPADM

## 2024-02-21 RX ORDER — CLOPIDOGREL BISULFATE 75 MG/1
75 TABLET ORAL DAILY
Qty: 30 TABLET | Refills: 2 | Status: SHIPPED | OUTPATIENT
Start: 2024-02-22 | End: 2024-02-21

## 2024-02-21 RX ORDER — LISINOPRIL 20 MG/1
20 TABLET ORAL DAILY
Status: DISCONTINUED | OUTPATIENT
Start: 2024-02-21 | End: 2024-02-21 | Stop reason: HOSPADM

## 2024-02-21 RX ORDER — CETIRIZINE HYDROCHLORIDE 10 MG/1
10 TABLET ORAL DAILY
Status: DISCONTINUED | OUTPATIENT
Start: 2024-02-21 | End: 2024-02-21 | Stop reason: HOSPADM

## 2024-02-21 RX ORDER — ATORVASTATIN CALCIUM 40 MG/1
40 TABLET, FILM COATED ORAL NIGHTLY
Qty: 30 TABLET | Refills: 2 | Status: SHIPPED | OUTPATIENT
Start: 2024-02-21 | End: 2024-05-21

## 2024-02-21 RX ORDER — CETIRIZINE HYDROCHLORIDE 10 MG/1
10 TABLET ORAL DAILY
Qty: 5 TABLET | Refills: 0 | Status: SHIPPED | OUTPATIENT
Start: 2024-02-22 | End: 2024-02-27

## 2024-02-21 RX ORDER — CEFDINIR 300 MG/1
300 CAPSULE ORAL 2 TIMES DAILY
Qty: 4 CAPSULE | Refills: 0 | Status: SHIPPED | OUTPATIENT
Start: 2024-02-21 | End: 2024-02-23

## 2024-02-21 RX ORDER — HYDROCHLOROTHIAZIDE 25 MG/1
25 TABLET ORAL DAILY
Status: DISCONTINUED | OUTPATIENT
Start: 2024-02-21 | End: 2024-02-21 | Stop reason: HOSPADM

## 2024-02-21 RX ORDER — POTASSIUM CHLORIDE 20 MEQ/1
20 TABLET, EXTENDED RELEASE ORAL ONCE
Status: COMPLETED | OUTPATIENT
Start: 2024-02-21 | End: 2024-02-21

## 2024-02-21 RX ORDER — CEFDINIR 300 MG/1
300 CAPSULE ORAL 2 TIMES DAILY
Qty: 4 CAPSULE | Refills: 0 | Status: SHIPPED | OUTPATIENT
Start: 2024-02-21 | End: 2024-02-21

## 2024-02-21 RX ORDER — CLOPIDOGREL BISULFATE 75 MG/1
75 TABLET ORAL DAILY
Qty: 30 TABLET | Refills: 2 | Status: SHIPPED | OUTPATIENT
Start: 2024-02-22 | End: 2024-05-22

## 2024-02-21 RX ORDER — ATORVASTATIN CALCIUM 40 MG/1
40 TABLET, FILM COATED ORAL NIGHTLY
Qty: 30 TABLET | Refills: 2 | Status: SHIPPED | OUTPATIENT
Start: 2024-02-21 | End: 2024-02-21

## 2024-02-21 RX ORDER — CETIRIZINE HYDROCHLORIDE 10 MG/1
10 TABLET ORAL DAILY
Qty: 5 TABLET | Refills: 0 | Status: SHIPPED | OUTPATIENT
Start: 2024-02-22 | End: 2024-02-21

## 2024-02-21 RX ADMIN — CETIRIZINE HYDROCHLORIDE 10 MG: 10 TABLET, FILM COATED ORAL at 08:02

## 2024-02-21 RX ADMIN — HYDROCHLOROTHIAZIDE 25 MG: 25 TABLET ORAL at 08:02

## 2024-02-21 RX ADMIN — FLECAINIDE ACETATE 100 MG: 100 TABLET ORAL at 08:02

## 2024-02-21 RX ADMIN — POTASSIUM CHLORIDE 20 MEQ: 1500 TABLET, EXTENDED RELEASE ORAL at 08:02

## 2024-02-21 RX ADMIN — LISINOPRIL 20 MG: 20 TABLET ORAL at 08:02

## 2024-02-21 RX ADMIN — CLOPIDOGREL BISULFATE 75 MG: 75 TABLET ORAL at 08:02

## 2024-02-21 NOTE — PT/OT/SLP PROGRESS
Physical Therapy Treatment    Patient Name:  Shelli Flores   MRN:  [id number]    Recommendations:     Discharge therapy intensity: Low Intensity Therapy   Discharge Equipment Recommendations: none  Barriers to discharge: None    Assessment:     Shelli Flores is a 78 y.o. female admitted with a medical diagnosis of CVA.  She presents with the following impairments/functional limitations: weakness, impaired endurance, impaired self care skills, impaired functional mobility, impaired balance, gait instability.    Pt sitting EOB with family in room upon PT entry to room. Pt stated she had already been up walking to the restroom prior to PT entry. Pt SBA for all mobility. She ambulated 60' w/ RW SBA, 240' no AD SBA, very slight unsteadiness with no LOB. Pt demo slight antalgic gait w/ R trunk lean, pt reported minimal L hip pain. Pt left up in chair at end of visit with family present. Low intensity therapy most appropriate upon d/c. Nurse notified of pt's tolerance to PT session.    Rehab Prognosis: Good; patient would benefit from acute skilled PT services to address these deficits and reach maximum level of function.    Recent Surgery: * No surgery found *      Plan:     During this hospitalization, patient to be seen 6 x/week to address the identified rehab impairments via therapeutic activities, therapeutic exercises, gait training and progress toward the following goals:    Plan of Care Expires:  03/13/24    Subjective     Chief Complaint: L hip pain  Patient/Family Comments/goals: go home  Pain/Comfort:  Pain Rating 1: 0/10      Objective:     Communicated with nurse prior to session.  Patient found sitting edge of bed with telemetry, pulse ox (continuous), peripheral IV upon PT entry to room.     General Precautions: Standard, fall  Orthopedic Precautions: N/A  Braces: N/A  Respiratory Status: Room air  Blood Pressure: NT  Skin Integrity: Visible skin intact      Functional Mobility:  Transfers:     Sit to Stand:   stand by assistance with hand-held assist  Gait: 60' w/ RW SBA, 240' no AD SBA, very slight unsteadiness with no LOB. Pt demo slight antalgic gait w/ R trunk lean, pt reported minimal L hip pain.     Therapeutic Activities/Exercises:      Education:  Education Provided:  Role and goals of PT, transfer training, bed mobility, gait training, balance training, safety awareness, assistive device, strengthening exercises, and importance of participating in PT to return to PLOF.     Understanding was verbalized.     Patient left up in chair with all lines intact, call button in reach, and family present..    GOALS:   Multidisciplinary Problems       Physical Therapy Goals          Problem: Physical Therapy    Goal Priority Disciplines Outcome Goal Variances Interventions   Physical Therapy Goal     PT, PT/OT Ongoing, Progressing     Description: Pt will be seen for the following goals  1. Pt will perform supine to sit and sit to supine with romero  2. Assess transfers and gait                       Time Tracking:     PT Received On: 02/21/24  PT Start Time: 0939     PT Stop Time: 0949  PT Total Time (min): 10 min     Billable Minutes: Gait Training 10    Treatment Type: Treatment  PT/PTA: PT     Number of PTA visits since last PT visit: 1 02/21/2024

## 2024-02-21 NOTE — PROGRESS NOTES
[facility]        Chief Complaint: Inpatient Follow-up for CVA    HPI:   Shelli Flores is a 78 y.o. female with a PMHx of HTN, HLD, BREN, CAD, history of V-tach who presented to [facility] on 2/20/2024 with c/o left lower extremity weakness that began around 4:00 a.m. and left upper extremity weakness that began around 8:00 a.m. on the morning of presentation. Patient then contacted her daughter who is a healthcare provider, who gave the patient full-dose ASA and presented to ED for further evaluation. Initial NIH 3. Code FAST activated and evaluated by neurology in ED, not a candidate for TNK.      Upon presentation to ED, vital signed included /80, HR 85, RR 20, SpO2 96% on room air, temperature 98.1° F.  Labs notable for WBC 11.52.  Urinalysis notable for 2+ nitrites, 75 leukocyte esterase, 11-20 WBCs, moderate bacteria and trace mucus.  Urine culture pending.  CT head negative for acute intracranial abnormality, chronic microvascular ischemic changes noted.  CTA head/neck negative for LVO or flow-limiting stenosis.  Admitted to hospital medicine services for further CVA workup.       Interval Hx:   Except for mildly elevated blood pressure overnight she remained hemodynamically stable.  More alert, improving strength in left upper extremity today.  Family members at bedside.  She has no new complaints or concerns ambulating well with in the room with no difficulty.  PT cleared her     Routine labs this morning showing improving white count, mild hypokalemia otherwise nothing major remarkable.  A1c was 6.2     MRI brain showed acute infarction in the right parietal region near the cerebral convexity, right maxillary sinusitis with chronic age-related changes.  TTE showed intact EF, grade 1 diastolic dysfunction, no major valvular abnormalities.  Carotid Doppler showed no significant abnormalities either.    Objective/physical exam:  Vitals:     02/20/24 2101 02/20/24 2317 02/21/24 0344 02/21/24 0711   BP: (!) 149/71 (!) 154/81 (!) 148/85 (!) 155/79   Pulse: 72 70 76 81   Resp: 18 20 18 18   Temp: 98.8 °F (37.1 °C) 97.6 °F (36.4 °C) 97.9 °F (36.6 °C) 98.4 °F (36.9 °C)   TempSrc: Oral Oral Oral Oral   SpO2: 97% (!) 94% 95% 95%   Weight:       Height:         General: In no acute distress, afebrile  Respiratory: Clear to auscultation bilaterally  Cardiovascular: S1, S2, no appreciable murmur  Abdomen: Soft, nontender, BS +  MSK: Warm, no lower extremity edema, no clubbing or cyanosis  Neurologic: Alert and oriented x4      Lab Results   Component Value Date     02/21/2024    K 3.4 (L) 02/21/2024    CO2 24 02/21/2024    BUN 8.0 (L) 02/21/2024    CREATININE 0.70 02/21/2024    CALCIUM 9.5 02/21/2024    EGFRNONAA >60 07/20/2020      Lab Results   Component Value Date    ALT 15 02/21/2024    AST 20 02/21/2024    ALKPHOS 63 02/21/2024    BILITOT 0.4 02/21/2024      Lab Results   Component Value Date    WBC 8.05 02/21/2024    HGB 13.5 02/21/2024    HCT 40.2 02/21/2024    MCV 97.3 (H) 02/21/2024     02/21/2024           Medications:   atorvastatin  40 mg Oral QHS    cefTRIAXone (Rocephin) IV (PEDS and ADULTS)  1 g Intravenous Q24H    cetirizine  10 mg Oral Daily    clopidogreL  75 mg Oral Daily    ezetimibe  10 mg Oral QHS    flecainide  100 mg Oral BID    fluticasone propionate  2 spray Each Nostril Daily    hydroCHLOROthiazide  25 mg Oral Daily    lisinopriL  20 mg Oral Daily    potassium chloride  20 mEq Oral Once      bisacodyL, labetalol     Assessment/Plan:    Right parietal CVA-?  Embolic  Left-sided deficits due to above-improving  GNR cystitis-POA  Right maxillary sinusitis seen on MRI    HX:  HTN, HLD, BREN, CAD, history of V-tach on flecainide    Plan:  -stroke workup reviewed.  MRI showed right parietal CVA.  Continue Plavix and statin.  Neuro on board  -consulted cardiology for LINQ placement  -resume home antihypertensives  -PT cleared  her  -continue ceftriaxone for 3 days total.  Switch to p.o. discharge  -she can be discharged home after LINQ placement  -home meds were reviewed and renewed.  Added Flonase and Zyrtec to help with sinusitis seen on MRI      SCDs    Dawit Ramachandran MD

## 2024-02-21 NOTE — NURSING
Nurses Note -- 4 Eyes      2/21/2024   2:13 AM      Skin assessed during: Admit      [x] No Altered Skin Integrity Present    []Prevention Measures Documented      [] Yes- Altered Skin Integrity Present or Discovered   [] LDA Added if Not in Epic (Describe Wound)   [] New Altered Skin Integrity was Present on Admit and Documented in LDA   [] Wound Image Taken    Wound Care Consulted? No    Attending Nurse:  Paloma Burroughs RN/Staff Member:   Kiya

## 2024-02-21 NOTE — DISCHARGE SUMMARY
[facility] - 4th Floor [facility] Medicine  Discharge Summary      Patient Name: Shelli Flores  MRN: [id number]  [facility]: [id number]  Patient Class: IP- Inpatient  Admission Date: 2/20/2024  Hospital Length of Stay: 1 days  Discharge Date and Time:  02/21/2024 11:52 AM  Attending Physician: Dawit Ramachandran MD   Discharging Provider: Dawit Ramachandran MD  Primary Care Provider: Eladio Porter MD    Primary Care Team: Networked reference to record PCT       Shelli Flores is a 78 y.o. female with a PMHx of HTN, HLD, BREN, CAD, history of V-tach who presented to [facility] on 2/20/2024 with c/o left lower extremity weakness that began around 4:00 a.m. and left upper extremity weakness that began around 8:00 a.m. on the morning of presentation. Patient then contacted her daughter who is a healthcare provider, who gave the patient full-dose ASA and presented to ED for further evaluation. Initial NIH 3. Code FAST activated and evaluated by neurology in ED, not a candidate for TNK.      Upon presentation to ED, vital signed included /80, HR 85, RR 20, SpO2 96% on room air, temperature 98.1° F.  Labs notable for WBC 11.52.  Urinalysis notable for 2+ nitrites, 75 leukocyte esterase, 11-20 WBCs, moderate bacteria and trace mucus.  Urine as sent for cultures.  CT head negative for acute intracranial abnormality, chronic microvascular ischemic changes noted.  CTA head/neck negative for LVO or flow-limiting stenosis.  Admitted to hospital medicine services for further CVA workup.    A1c was 6.2 . MRI brain showed acute infarction in the right parietal region near the cerebral convexity, right maxillary sinusitis with chronic age-related changes.  TTE showed intact EF, grade 1 diastolic dysfunction, no major valvular abnormalities.  Carotid Doppler showed no significant abnormalities either.  Neurology evaluated her recommended Plavix with statin.  Aspirin was not recommended due to history of  bruising from aspirin in the past.  Neuro also suggested LINQ placement for which Cardiology was consulted LINQ was inserted prior to discharge.  Ceftriaxone was added at admission and she will switch to cefdinir at discharge for GNR cystitis seen at admission.  She did well with PT and she was cleared for discharge to home.  All medications were reconciled, necessary prescriptions were provided.  Counseled on medication adherence, outpatient follow up.      Right parietal CVA  Left-sided deficits due to above-improving  GNR cystitis-POA  Right maxillary sinusitis seen on MRI     HX:  HTN, HLD, BREN, CAD, history of V-tach on flecainide    Goals of Care Treatment Preferences:  Code Status: Full Code          Consults:   Consults (From admission, onward)          Status Ordering Provider     Inpatient consult to Cardiology  Once        Provider:  Piero Freeman MD    Acknowledged SMITH HUITRON     Inpatient consult to Social Work/Case Management  Once        Provider:  (Not yet assigned)    Acknowledged DAVID SONG     IP consult to case management/social work  Once        Provider:  (Not yet assigned)    Acknowledged VIVI SWAIN            No new Assessment & Plan notes have been filed under this hospital service since the last note was generated.  Service: Hospital Medicine    Final Active Diagnoses:    Diagnosis Date Noted POA    PRINCIPAL PROBLEM:  Stroke [I63.9] 02/21/2024 Yes      Problems Resolved During this Admission:       Discharged Condition: good    Disposition: Home or Self Care    Follow Up:   Follow-up Information       Eladio Porter MD Follow up in 1 week(s).    Specialty: Obstetrics and Gynecology  Contact information:  [address] [place] [address] [phone]                           Patient Instructions:   No discharge procedures on file.    Significant Diagnostic Studies: Labs: CMP   Recent Labs   Lab 02/20/24  1105 02/21/24  0342    137   K 3.7  3.4*   CO2 26 24   BUN 13.9 8.0*   CREATININE 0.79 0.70   CALCIUM 9.8 9.5   ALBUMIN 3.8 3.6   BILITOT 0.4 0.4   ALKPHOS 69 63   AST 23 20   ALT 18 15       Pending Diagnostic Studies:       None           Medications:  Reconciled Home Medications:      Medication List        START taking these medications      atorvastatin 40 MG tablet  Commonly known as: LIPITOR  Take 1 tablet (40 mg total) by mouth every evening.     cefdinir 300 MG capsule  Commonly known as: OMNICEF  Take 1 capsule (300 mg total) by mouth 2 (two) times daily. for 2 days     cetirizine 10 MG tablet  Commonly known as: ZYRTEC  Take 1 tablet (10 mg total) by mouth once daily. for 5 days  Start taking on: February 22, 2024     clopidogreL 75 mg tablet  Commonly known as: PLAVIX  Take 1 tablet (75 mg total) by mouth once daily.  Start taking on: February 22, 2024            CONTINUE taking these medications      alendronate 70 MG tablet  Commonly known as: FOSAMAX  PLEASE SEE ATTACHED FOR DETAILED DIRECTIONS     ezetimibe 10 mg tablet  Commonly known as: ZETIA  Take 10 mg by mouth every evening.     flecainide 100 MG Tab  Commonly known as: TAMBOCOR  Take 100 mg by mouth 2 (two) times daily.     lisinopriL-hydrochlorothiazide 20-25 mg Tab  Commonly known as: PRINZIDE,ZESTORETIC  Take 1 tablet by mouth once daily.            STOP taking these medications      ciprofloxacin HCl 500 MG tablet  Commonly known as: CIPRO     rosuvastatin 20 MG tablet  Commonly known as: CRESTOR              Indwelling Lines/Drains at time of discharge:   Lines/Drains/Airways       None                   Time spent on the discharge of patient: 35 minutes         Dawit Ramachandran MD  Department of Hospital Medicine  [facility] - 4th Floor Medical Telemetry

## 2024-02-21 NOTE — PLAN OF CARE
Counseled patient on Rethinking Drinking and provided a copy.  Patient accepted and voiced understanding.

## 2024-02-21 NOTE — HOSPITAL COURSE
2/20/24:  Presented to ED with left-sided weakness and LUE ataxia.  Initial NIH 3. OOW for TNK.  No LDL on CTA.  She was admitted for stroke workup.

## 2024-02-21 NOTE — PROCEDURES
Final impression:  Successful placement of a LUX loop recorder.      Date of procedure: 02/21/2024      Indication: Acute CVA / Cryptogenic    Informed consent: R/I/B/A discussed in detail with the patient who agrees to proceed.      Description of the procedure: The area of the anterior left chest wall was marked and prepped.  Local anesthesia was used to numb the skin.  After that, a small incision was achieved and the LUX device was inserted successfully without any difficulties or immediate complications.  The small incision of the skin was closed via skin glue and a Tegaderm was placed.  The patient tolerated the procedure well without any acute complications.  The LUX device was tested and a good signal was obtained. R wave amplitude 0.53 mV.

## 2024-02-21 NOTE — PLAN OF CARE
Problem: Adult Inpatient Plan of Care  Goal: Patient-Specific Goal (Individualized)  Outcome: Ongoing, Progressing  Goal: Optimal Comfort and Wellbeing  Outcome: Ongoing, Progressing     Problem: Adjustment to Illness (Stroke, Ischemic/Transient Ischemic Attack)  Goal: Optimal Coping  Outcome: Ongoing, Progressing     Problem: Bowel Elimination Impaired (Stroke, Ischemic/Transient Ischemic Attack)  Goal: Effective Bowel Elimination  Outcome: Ongoing, Progressing     Problem: Cerebral Tissue Perfusion (Stroke, Ischemic/Transient Ischemic Attack)  Goal: Optimal Cerebral Tissue Perfusion  Outcome: Ongoing, Progressing     Problem: Communication Impairment (Stroke, Ischemic/Transient Ischemic Attack)  Goal: Improved Communication Skills  Outcome: Ongoing, Progressing

## 2024-02-21 NOTE — SUBJECTIVE & OBJECTIVE
Subjective:     Interval History:  Sitting in a chair, working with PT. No new neurological issues overnight.  Reports improvement in left hand ataxia.  MRI brain confirmed acute infarct in the right parietal region.      Current Facility-Administered Medications   Medication Dose Route Frequency Provider Last Rate Last Admin    atorvastatin tablet 40 mg  40 mg Oral QHS Dawit Ramachandran MD   40 mg at 02/20/24 2137    bisacodyL suppository 10 mg  10 mg Rectal Daily PRN Danna Stacy AGACNP-BC        cefTRIAXone (Rocephin) 1 g in dextrose 5 % in water (D5W) 100 mL IVPB (MB+)  1 g Intravenous Q24H Dawit Ramachandran MD   Stopped at 02/20/24 1550    cetirizine tablet 10 mg  10 mg Oral Daily Dawit Ramachandran MD   10 mg at 02/21/24 0801    clopidogreL tablet 75 mg  75 mg Oral Daily Myles Metzger MD   75 mg at 02/21/24 0800    ezetimibe tablet 10 mg  10 mg Oral QHS Dawit Ramachandran MD   10 mg at 02/20/24 2138    flecainide tablet 100 mg  100 mg Oral BID Dawit Ramachandran MD   100 mg at 02/21/24 0800    fluticasone propionate 50 mcg/actuation nasal spray 100 mcg  2 spray Each Nostril Daily Dawit Ramachandran MD        hydroCHLOROthiazide tablet 25 mg  25 mg Oral Daily Dawit Ramachandran MD   25 mg at 02/21/24 0800    labetaloL injection 10 mg  10 mg Intravenous Q6H PRN Danna Stacy AGACNP-BC        lisinopriL tablet 20 mg  20 mg Oral Daily Dawit Ramachandran MD   20 mg at 02/21/24 0801       Review of Systems   Neurological:  Positive for weakness (Mild left-sided weakness). Negative for dizziness, tremors, seizures, syncope, facial asymmetry, speech difficulty, light-headedness, numbness and headaches.   All other systems reviewed and are negative.    Objective:     Vital Signs (Most Recent):  Temp: 98.1 °F (36.7 °C) (02/21/24 1035)  Pulse: 84 (02/21/24 1035)  Resp: 18 (02/21/24 1035)  BP: (!) 150/85 (02/21/24 1035)  SpO2: (!) 94 % (02/21/24 1035) Vital Signs (24h Range):  Temp:  [97.6 °F  (36.4 °C)-98.8 °F (37.1 °C)] 98.1 °F (36.7 °C)  Pulse:  [70-84] 84  Resp:  [15-27] 18  SpO2:  [94 %-100 %] 94 %  BP: (148-173)/() 150/85     Weight: 76.6 kg (168 lb 15.7 oz)  Body mass index is 29.01 kg/m².     Physical Exam  Vitals and nursing note reviewed.   Constitutional:       General: She is awake. She is not in acute distress.     Appearance: She is not ill-appearing.   HENT:      Head: Normocephalic.   Eyes:      General: Vision grossly intact. No visual field deficit.     Extraocular Movements: Extraocular movements intact.      Pupils: Pupils are equal, round, and reactive to light.   Cardiovascular:      Rate and Rhythm: Normal rate.   Pulmonary:      Effort: Pulmonary effort is normal.   Skin:     General: Skin is warm and dry.      Capillary Refill: Capillary refill takes less than 2 seconds.   Neurological:      Mental Status: She is alert and oriented to person, place, and time. Mental status is at baseline.      Cranial Nerves: No dysarthria or facial asymmetry.      Sensory: No sensory deficit.      Motor: Weakness (Decreased  strength left hand) and pronator drift (LUE) present. No tremor, atrophy or seizure activity.      Coordination: Finger-Nose-Finger Test normal.   Psychiatric:         Attention and Perception: Attention normal.         Mood and Affect: Mood and affect normal.         Speech: Speech normal.         Behavior: Behavior normal.        Significant Labs: BMP:   Recent Labs   Lab 02/20/24  1105 02/21/24  0342    137   K 3.7 3.4*   CO2 26 24   BUN 13.9 8.0*   CREATININE 0.79 0.70   CALCIUM 9.8 9.5   MG  --  2.00     CBC:   Recent Labs   Lab 02/20/24  1058 02/20/24  1105 02/21/24  0342   WBC  --  11.52* 8.05   HGB  --  13.6 13.5   HCT 43 40.6 40.2   PLT  --  260 241       Significant Imaging: I have reviewed all pertinent imaging results/findings within the past 24 hours.

## 2024-02-21 NOTE — PT/OT/SLP EVAL
"Occupational Therapy   Evaluation and Discharge Note    Name: Shelli Flores  MRN: [id number]  Admitting Diagnosis: stroke  Recent Surgery: * No surgery found *      Recommendations:     Discharge therapy intensity: No Therapy Indicated   Discharge Equipment Recommendations: shower chair, walker, rolling (Pt obtaining RW and shower chair from friends and drug store.)  Barriers to discharge:  None    Assessment:     Shelli Flores is a 78 y.o. female with a medical diagnosis of stroke. On eval, patient presents with ability to complete dressing, grooming in standing position, and functional mobility with supervision using RW. Patient was able to ambulate with supervision without RW but felt a tad unsteady. Patient will have adequate help and supervision to return home with outpatient PT. Skilled OT services are not warranted at this time.    Plan:     OT to sign off as acute OT services are not warranted at this time.  Please re-consult if situation changes during this hospitalization.    Plan of Care Reviewed with: patient, spouse, daughter    Subjective     Chief Complaint: "I'm feeling much better today."  Patient/Family Comments/goals: To return to [facility]    Occupational Profile:  Living Environment: Pt lives in a 1SH with . She has a walk-in shower. Pt obtaining shower bench and RW.    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     Pain/Comfort:  Pain Rating 1: 0/10    Patients cultural, spiritual, Pentec[employer]al conflicts given the current situation: no    Objective:     OT communicated with RN prior to session.      Patient was found up in chair with peripheral IV upon OT entry to room.    General Precautions: Standard, fall  Orthopedic Precautions: N/A  Braces: N/A    Vital Signs: Blood Pressure: 147/90    Bed Mobility:    Pt " reported not requiring assistance with bed mobility.     Functional Mobility/Transfers:  Patient completed Sit <> Stand Transfer with supervision  with  rolling walker   Patient completed Toilet Transfer Step Transfer technique with supervision with  rolling walker  Functional Mobility: Pt able to ambulate with RW with supervision. Trial of ambulation with no AD and CGA. Patient reported feeling a little unsteady.     Activities of Daily Living:  Grooming: modified independence standing at sink  Lower Body Dressing: supervision to don/doff socks    Functional Cognition:  Intact    Visual Perceptual Skills:  Intact    Upper Extremity Function:  Right Upper Extremity:   Strength: WFL; 4+/5  Pain at elbow due to fall  Coordination: WFL    Left Upper Extremity:  Strength: 4-/5; slightly decreased  strength compared to R hand. Patient and  report history of decreased  strength in B hands however.   Coordination: slightly impaired    Balance:   Reported feeling slightly off balance during ambulation without RW but did not exhibit any LOB during mobility to/from bathroom. Patient exhibits good safety awareness when performing ambulation     Therapeutic Positioning  Risk for acquired pressure injuries is decreased due to ability to get to BSC/toilet with assist and ability to mobilize independently .    OT interventions performed during the course of today's session in an effort to prevent and/or reduce acquired pressure injuries:   Education was provided on benefits of and recommendations for therapeutic positioning    Skin assessment: all bony prominences were assessed    Findings: no redness or breakdown noted    OT recommendations for therapeutic positioning throughout hospitalization:   Follow [facility] Pressure Injury Prevention Protocol    Patient Education:  Patient and family were provided with verbal education education regarding safety awareness and Discharge/DME recommendations.  Understanding was  verbalized.     Patient left up in chair with  family present    History:     Past Medical History:   Diagnosis Date    Hyperlipidemia     Hypertension     V-tach          Past Surgical History:   Procedure Laterality Date    HYSTERECTOMY      skin cancer removal         Time Tracking:     OT Date of Treatment:    OT Start Time: 1051  OT Stop Time: 1110  OT Total Time (min): 19 min    Billable Minutes:Evaluation moderate complexity    2/21/2024

## 2024-02-21 NOTE — PROGRESS NOTES
[facility] - 4th Floor Medical Telemetry  Neurology  Progress Note    Patient Name: Shelli Flores  MRN: [id number]  Admission Date: 2/20/2024  Hospital Length of Stay: 1 days  Code Status: Full Code   Attending Provider: Dawit Ramachandran MD  Primary Care Physician: Eladio Porter MD   Principal Problem:<principal problem not specified>      Overview/Hospital Course:  2/20/24:  Presented to ED with left-sided weakness and LUE ataxia.  Initial NIH 3. OOW for TNK.  No LDL on CTA.  She was admitted for stroke workup.        Subjective:     Interval History:  Sitting in a chair, working with PT. No new neurological issues overnight.  Reports improvement in left hand ataxia.  MRI brain confirmed acute infarct in the right parietal region.      Current Facility-Administered Medications   Medication Dose Route Frequency Provider Last Rate Last Admin    atorvastatin tablet 40 mg  40 mg Oral QHS Dawit Ramachandran MD   40 mg at 02/20/24 2137    bisacodyL suppository 10 mg  10 mg Rectal Daily PRN Danna Stacy AGACNP-BC        cefTRIAXone (Rocephin) 1 g in dextrose 5 % in water (D5W) 100 mL IVPB (MB+)  1 g Intravenous Q24H Dawit Ramachandran MD   Stopped at 02/20/24 1550    cetirizine tablet 10 mg  10 mg Oral Daily Dawit Ramachandran MD   10 mg at 02/21/24 0801    clopidogreL tablet 75 mg  75 mg Oral Daily Myles Metzger MD   75 mg at 02/21/24 0800    ezetimibe tablet 10 mg  10 mg Oral QHS Dawit Ramachandran MD   10 mg at 02/20/24 2138    flecainide tablet 100 mg  100 mg Oral BID Dawit Ramachandran MD   100 mg at 02/21/24 0800    fluticasone propionate 50 mcg/actuation nasal spray 100 mcg  2 spray Each Nostril Daily Dawit Ramachandran MD        hydroCHLOROthiazide tablet 25 mg  25 mg Oral Daily Dawit Ramachandran MD   25 mg at 02/21/24 0800    labetaloL injection 10 mg  10 mg Intravenous Q6H PRN Doumit, Danna B, AGACNP-BC        lisinopriL tablet 20 mg  20 mg Oral Daily Dawit Ramachandran,  MD   20 mg at 02/21/24 0801       Review of Systems   Neurological:  Positive for weakness (Mild left-sided weakness). Negative for dizziness, tremors, seizures, syncope, facial asymmetry, speech difficulty, light-headedness, numbness and headaches.   All other systems reviewed and are negative.    Objective:     Vital Signs (Most Recent):  Temp: 98.1 °F (36.7 °C) (02/21/24 1035)  Pulse: 84 (02/21/24 1035)  Resp: 18 (02/21/24 1035)  BP: (!) 150/85 (02/21/24 1035)  SpO2: (!) 94 % (02/21/24 1035) Vital Signs (24h Range):  Temp:  [97.6 °F (36.4 °C)-98.8 °F (37.1 °C)] 98.1 °F (36.7 °C)  Pulse:  [70-84] 84  Resp:  [15-27] 18  SpO2:  [94 %-100 %] 94 %  BP: (148-173)/() 150/85     Weight: 76.6 kg (168 lb 15.7 oz)  Body mass index is 29.01 kg/m².     Physical Exam  Vitals and nursing note reviewed.   Constitutional:       General: She is awake. She is not in acute distress.     Appearance: She is not ill-appearing.   HENT:      Head: Normocephalic.   Eyes:      General: Vision grossly intact. No visual field deficit.     Extraocular Movements: Extraocular movements intact.      Pupils: Pupils are equal, round, and reactive to light.   Cardiovascular:      Rate and Rhythm: Normal rate.   Pulmonary:      Effort: Pulmonary effort is normal.   Skin:     General: Skin is warm and dry.      Capillary Refill: Capillary refill takes less than 2 seconds.   Neurological:      Mental Status: She is alert and oriented to person, place, and time. Mental status is at baseline.      Cranial Nerves: No dysarthria or facial asymmetry.      Sensory: No sensory deficit.      Motor: Weakness (Decreased  strength left hand) and pronator drift (LUE) present. No tremor, atrophy or seizure activity.      Coordination: Finger-Nose-Finger Test normal.   Psychiatric:         Attention and Perception: Attention normal.         Mood and Affect: Mood and affect normal.         Speech: Speech normal.         Behavior: Behavior normal.         Significant Labs: BMP:   Recent Labs   Lab 02/20/24  1105 02/21/24  0342    137   K 3.7 3.4*   CO2 26 24   BUN 13.9 8.0*   CREATININE 0.79 0.70   CALCIUM 9.8 9.5   MG  --  2.00     CBC:   Recent Labs   Lab 02/20/24  1058 02/20/24  1105 02/21/24  0342   WBC  --  11.52* 8.05   HGB  --  13.6 13.5   HCT 43 40.6 40.2   PLT  --  260 241       Significant Imaging: I have reviewed all pertinent imaging results/findings within the past 24 hours.    Assessment and Plan:     * Stroke  - presented with left-sided weakness and LUE ataxia  - stroke RF:  HTN, HLD, BREN, CAD  - interventions: None.  OOW for TNK.  No LVO on CTA.  - etiology: likely small vessel disease    Stroke workup:  -CTH:  1. No acute intracranial abnormality.  2. Chronic microvascular ischemic changes.   -CTA h/n:  No large vessel occlusion or flow-limiting stenosis  -MRI brain:  1.  Acute infarct in the right parietal region near the cerebral convexity  2.  Chronic age related changes seen  3.  Right maxillary sinusitis  -ECHO:  EF 50-55%, bubble study negative, normal LA  -CUS:  Bilateral ICA less than 50% stenosis  -LDL: 64  -A1c:  6.2  -TSH:  1.283      Plan:  -Continue Plavix 75mg daily ... will avoid DAPT due to h/o excessive bruising with ASA alone.    -Continue Atorvastatin 40mg daily  -consult cardiology for ILR placement    Further recommendations to follow by MD.         VTE Risk Mitigation (From admission, onward)           Ordered     IP VTE HIGH RISK PATIENT  Once         02/20/24 1219     Place sequential compression device  Until discontinued         02/20/24 1219                    Shania Bowles, SERGIO  Neurology  [facility] - 4th Floor Medical Telemetry

## 2024-02-21 NOTE — CONSULTS
Inpatient consult to Cardiology  Consult performed by: Aleyda Rocha FNP  Consult ordered by: Shania Bowles FNP  Reason for consult: LINQ Placement      [facility] - 4th Floor Medical Telemetry    Cardiology  Consult Note    Patient Name: Shelli Flores  MRN: [id number]  Admission Date: 2/20/2024  Hospital Length of Stay: 1 days  Code Status: Full Code   Attending Provider: Dawit Ramachandran MD   Consulting Provider: SERGIO Werner  Primary Care Physician: Eladio Porter MD  Principal Problem:<principal problem not specified>    Patient information was obtained from patient, past medical records, and ER records.     Subjective:     Reason for Consult: LINQ Placement    HPI: Ms. Flores is a 78 year old female who is known to [facility], Dr. Gatica. She presents to the ER with complaints of LLE weakness that began around 4:00 am and LUE weakness around 8:00 am on the morning of presentation. She denies CP, SOB, palpitations, nausea, vomiting, fever, or chills. Her lab work is relatively insignificant. A UA was obtained and positive for gram negative rods. An MRI brain was obtained and demonstrated acute infarct in the right parietal region near the cerebral convexity. [facility] has been consulted to place an ILR    PMH: HTN, HLD, BREN, CAD, VTACH  PSH: hysterectomy, skin CA excision   Family History: Mother - breast CA; Father - CAD, HTN  Social History: Denies tobacco or illicit drug use. Drinks 4 glasses of wine per week.     Previous Cardiac Diagnostics:   TTE 2.20.24:  Left Ventricle: Regional wall motion abnormalities present. There is low normal systolic function with a visually estimated ejection fraction of 50 - 55%. Grade I diastolic dysfunction.  Right Ventricle: Normal right ventricular cavity size. Systolic function is normal.  Left Atrium: Agitated saline study of the atrial septum is negative, suggesting absence of intracardiac shunt at the atrial level.  Mitral Valve: There is trace  regurgitation.  IVC/SVC: Normal venous pressure at 3 mmHg.    Carotid US 2.20.24:  The bilateral internal carotid artery demonstrated less than 50% stenosis.   The bilateral vertebral arteries were patent with antegrade flow.     PET 6.6.23:  This is a normal perfusion study, no perfusion defects noted. There is no evidence of ischemia.   This scan is suggestive of low risk for future cardiovascular events.   The left ventricular cavity is noted to be mildly enlarged on the stress studies. The stress left ventricular ejection fraction was calculated to be 66% and left ventricular global function is normal. The rest left ventricular cavity is noted to be mildly enlarged. The rest left ventricular ejection fraction was calculated to be 60% and rest left ventricular global function is normal.   When compared to the resting ejection fraction (60%), the stress ejection fraction (66%) has increased.   The study quality is excellent.   There was a rise in myocardial blood flow between rest and stress.  Global myocardial blood flow reserve was 3.49.  Normal global coronary flow reserve is suggestive of low coronary event risk.    TTE 6.6.23:  The study quality is average.  The left ventricle is normal in size. Global left ventricular systolic function is normal. The left ventricular ejection fraction is 60%. Left ventricular diastolic function is normal.  The left atrium is mildly enlarged based on the left atrium volume index of 41.8m2.  Dilatation of the aortic root limited to the sinus of valsalva (4.1 cms) is noted.  Mild (1+) mitral regurgitation.  The pulmonary artery systolic pressure is 31 mmHg.      Review of patient's allergies indicates:  No Known Allergies  No current facility-administered medications on file prior to encounter.     Current Outpatient Medications on File Prior to Encounter   Medication Sig    ezetimibe (ZETIA) 10 mg tablet Take 10 mg by mouth every evening.    flecainide (TAMBOCOR) 100 MG Tab  Take 100 mg by mouth 2 (two) times daily.    lisinopriL-hydrochlorothiazide (PRINZIDE,ZESTORETIC) 20-25 mg Tab Take 1 tablet by mouth once daily.    [DISCONTINUED] rosuvastatin (CRESTOR) 20 MG tablet Take 20 mg by mouth once daily.    alendronate (FOSAMAX) 70 MG tablet PLEASE SEE ATTACHED FOR DETAILED DIRECTIONS    [DISCONTINUED] ciprofloxacin HCl (CIPRO) 500 MG tablet Take 500 mg by mouth 2 (two) times daily.     Review of Systems   Respiratory:  Negative for shortness of breath.    Cardiovascular:  Negative for chest pain and palpitations.       Objective:     Vital Signs (Most Recent):  Temp: 98.1 °F (36.7 °C) (02/21/24 1035)  Pulse: 84 (02/21/24 1035)  Resp: 18 (02/21/24 1035)  BP: (!) 150/85 (02/21/24 1035)  SpO2: (!) 94 % (02/21/24 1035) Vital Signs (24h Range):  Temp:  [97.6 °F (36.4 °C)-98.8 °F (37.1 °C)] 98.1 °F (36.7 °C)  Pulse:  [70-84] 84  Resp:  [18-27] 18  SpO2:  [94 %-100 %] 94 %  BP: (148-173)/() 150/85   Weight: 76.6 kg (168 lb 15.7 oz)  Body mass index is 29.01 kg/m².  SpO2: (!) 94 %       Intake/Output Summary (Last 24 hours) at 2/21/2024 1329  Last data filed at 2/21/2024 1318  Gross per 24 hour   Intake 290 ml   Output 700 ml   Net -410 ml     Lines/Drains/Airways       Peripheral Intravenous Line  Duration                  Peripheral IV - Single Lumen 02/20/24 1052 20 G Right Antecubital 1 day                  Significant Labs:  Recent Results (from the past 72 hour(s))   POCT glucose    Collection Time: 02/20/24 10:33 AM   Result Value Ref Range    POCT Glucose 127 (H) 70 - 110 mg/dL   ISTAT CHEM8    Collection Time: 02/20/24 10:58 AM   Result Value Ref Range    POC Glucose 115 (H) 70 - 110 mg/dL    POC BUN 14 6 - 30 mg/dL    POC Creatinine 0.7 0.5 - 1.4 mg/dL    POC Sodium 135 (L) 136 - 145 mmol/L    POC Potassium 3.5 3.5 - 5.1 mmol/L    POC Chloride 96 95 - 110 mmol/L    POC TCO2 (MEASURED) 28 23 - 29 mmol/L    POC Anion Gap 15 8 - 16 mmol/L    POC Ionized Calcium 1.20 1.06 - 1.42  mmol/L    POC Hematocrit 43 36 - 54 %PCV    POC HEMOGLOBIN 15 g/dL    Sample VENOUS    Comprehensive metabolic panel    Collection Time: 02/20/24 11:05 AM   Result Value Ref Range    Sodium Level 136 136 - 145 mmol/L    Potassium Level 3.7 3.5 - 5.1 mmol/L    Chloride 101 98 - 107 mmol/L    Carbon Dioxide 26 23 - 31 mmol/L    Glucose Level 109 82 - 115 mg/dL    Blood Urea Nitrogen 13.9 9.8 - 20.1 mg/dL    Creatinine 0.79 0.55 - 1.02 mg/dL    Calcium Level Total 9.8 8.4 - 10.2 mg/dL    Protein Total 6.6 5.8 - 7.6 gm/dL    Albumin Level 3.8 3.4 - 4.8 g/dL    Globulin 2.8 2.4 - 3.5 gm/dL    Albumin/Globulin Ratio 1.4 1.1 - 2.0 ratio    Bilirubin Total 0.4 <=1.5 mg/dL    Alkaline Phosphatase 69 40 - 150 unit/L    Alanine Aminotransferase 18 0 - 55 unit/L    Aspartate Aminotransferase 23 5 - 34 unit/L    eGFR >60 mls/min/1.73/m2   Troponin I    Collection Time: 02/20/24 11:05 AM   Result Value Ref Range    Troponin-I <0.010 0.000 - 0.045 ng/mL   CBC with Differential    Collection Time: 02/20/24 11:05 AM   Result Value Ref Range    WBC 11.52 (H) 4.50 - 11.50 x10(3)/mcL    RBC 4.16 (L) 4.20 - 5.40 x10(6)/mcL    Hgb 13.6 12.0 - 16.0 g/dL    Hct 40.6 37.0 - 47.0 %    MCV 97.6 (H) 80.0 - 94.0 fL    MCH 32.7 (H) 27.0 - 31.0 pg    MCHC 33.5 33.0 - 36.0 g/dL    RDW 13.9 11.5 - 17.0 %    Platelet 260 130 - 400 x10(3)/mcL    MPV 9.6 7.4 - 10.4 fL    Neut % 76.4 %    Lymph % 14.8 %    Mono % 7.9 %    Eos % 0.3 %    Basophil % 0.3 %    Lymph # 1.71 0.6 - 4.6 x10(3)/mcL    Neut # 8.79 2.1 - 9.2 x10(3)/mcL    Mono # 0.91 0.1 - 1.3 x10(3)/mcL    Eos # 0.04 0 - 0.9 x10(3)/mcL    Baso # 0.04 <=0.2 x10(3)/mcL    IG# 0.03 0 - 0.04 x10(3)/mcL    IG% 0.3 %    NRBC% 0.0 %   Lipid panel    Collection Time: 02/20/24 11:05 AM   Result Value Ref Range    Cholesterol Total 140 <=200 mg/dL    HDL Cholesterol 65 (H) 35 - 60 mg/dL    Triglyceride 53 37 - 140 mg/dL    Cholesterol/HDL Ratio 2 0 - 5    Very Low Density Lipoprotein 11     LDL  Cholesterol 64.00 50.00 - 140.00 mg/dL   TSH    Collection Time: 02/20/24 11:05 AM   Result Value Ref Range    TSH 1.283 0.350 - 4.940 uIU/mL   Urinalysis, Reflex to Urine Culture    Collection Time: 02/20/24 12:03 PM    Specimen: Urine   Result Value Ref Range    Color, UA Light-Yellow Yellow, Light-Yellow, Colorless, Straw, Dark-Yellow    Appearance, UA Clear Clear    Specific Gravity, UA 1.039 (H) 1.005 - 1.030    pH, UA 7.5 5.0 - 8.5    Protein, UA Negative Negative    Glucose, UA Normal Negative, Normal    Ketones, UA Negative Negative    Blood, UA Negative Negative    Bilirubin, UA Negative Negative    Urobilinogen, UA Normal 0.2, 1.0, Normal    Nitrites, UA 2+ (A) Negative    Leukocyte Esterase, UA 75 (A) Negative    WBC, UA 11-20 (A) None Seen, 0-2, 3-5, 0-5 /HPF    Bacteria, UA Moderate (A) None Seen, Trace /HPF    Squamous Epithelial Cells, UA None Seen None Seen /HPF    Mucous, UA Trace (A) None Seen /LPF    RBC, UA 0-5 None Seen, 0-2, 3-5, 0-5 /HPF   Urine culture    Collection Time: 02/20/24 12:03 PM    Specimen: Urine   Result Value Ref Range    Urine Culture >/= 100,000 colonies/ml Gram-negative Rods (A)    Echo    Collection Time: 02/20/24  2:31 PM   Result Value Ref Range    BSA 1.86 m2    Haider's Biplane MOD Ejection Fraction 56 %    LVOT stroke volume 54.64 cm3    LVIDd 4.80 3.5 - 6.0 cm    LV Systolic Volume 37.60 mL    LV Systolic Volume Index 20.7 mL/m2    LVIDs 3.09 2.1 - 4.0 cm    LV Diastolic Volume 108.00 mL    LV Diastolic Volume Index 59.34 mL/m2    IVS 1.40 (A) 0.6 - 1.1 cm    LVOT diameter 2.00 cm    LVOT area 3.1 cm2    FS 36 28 - 44 %    Left Ventricle Relative Wall Thickness 0.55 cm    Posterior Wall 1.31 (A) 0.6 - 1.1 cm    LV mass 260.99 g    LV Mass Index 143 g/m2    MV Peak E Joey 0.49 m/s    TDI LATERAL 0.09 m/s    TDI SEPTAL 0.05 m/s    E/E' ratio 7.00 m/s    MV Peak A Joey 0.79 m/s    TR Max Joey 1.62 m/s    E/A ratio 0.62     E wave deceleration time 243.00 msec    LV SEPTAL  E/E' RATIO 9.80 m/s    LV LATERAL E/E' RATIO 5.44 m/s    LVOT peak jaron 0.95 m/s    Left Ventricular Outflow Tract Mean Velocity 0.60 cm/s    Left Ventricular Outflow Tract Mean Gradient 2.00 mmHg    TAPSE 1.98 cm    LA size 3.40 cm    LA volume (mod) 17.80 cm3    LA Volume Index (Mod) 9.8 mL/m2    AV mean gradient 3 mmHg    AV peak gradient 5 mmHg    Ao peak jaron 1.11 m/s    Ao VTI 22.20 cm    LVOT peak VTI 17.40 cm    AV valve area 2.46 cm²    AV Velocity Ratio 0.86     AV index (prosthetic) 0.78     KOMAL by Velocity Ratio 2.69 cm²    MV mean gradient 1 mmHg    MV peak gradient 3 mmHg    MV stenosis pressure 1/2 time 32.00 ms    MV valve area p 1/2 method 6.88 cm2    MV valve area by continuity eq 3.07 cm2    MV VTI 17.8 cm    Triscuspid Valve Regurgitation Peak Gradient 10 mmHg    PV PEAK VELOCITY 0.96 m/s    PV peak gradient 4 mmHg    Mean e' 0.07 m/s    ZLVIDS -0.06     ZLVIDD -0.49     TV resting pulmonary artery pressure 13 mmHg    RV TB RVSP 5 mmHg    Est. RA pres 3 mmHg   CV Ultrasound Bilateral Doppler Carotid    Collection Time: 02/20/24  9:56 PM   Result Value Ref Range    Right CCA prox sys 68 cm/s    Right CCA prox thompson 12 cm/s    Right CCA dist sys 71 cm/s    Right CCA dist thompson 19 cm/s    Right ICA prox sys 56 cm/s    Right ICA prox thompson 5 cm/s    Right ICA mid sys 60 cm/s    Right ICA mid thompson 15 cm/s    Right ICA dist sys 74 cm/s    Right ICA dist thompson 17 cm/s    Right ECA sys 81 cm/s    Right ECA thompson 0 cm/s    Right vertebral sys 30 cm/s    Right vertebral thompson 4 cm/s    Right ICA/CCA ratio 1.04     Right Highest ICA 74.00     Right Highest EDV 17.00     Right Highest CCA 71     Left CCA prox sys 106 cm/s    Left CCA prox thompson 22 cm/s    Left CCA dist sys 90 cm/s    Left CCA dist thompson 23 cm/s    Left ICA prox sys 78 cm/s    Left ICA prox thompson 12 cm/s    Left ICA mid sys 73 cm/s    Left ICA mid thompson 21 cm/s    Left ICA dist sys 88 cm/s    Left ICA dist thompson 25 cm/s    Left ECA sys 78 cm/s    Left ECA  thompson 14 cm/s    Left vertebral sys 41 cm/s    Left vertebral thompson 10 cm/s    Left ICA/CCA ratio 0.98     Left Highest ICA 88.00     LT Highest EDV 25.00     Left Highest     Comprehensive metabolic panel    Collection Time: 02/21/24  3:42 AM   Result Value Ref Range    Sodium Level 137 136 - 145 mmol/L    Potassium Level 3.4 (L) 3.5 - 5.1 mmol/L    Chloride 103 98 - 107 mmol/L    Carbon Dioxide 24 23 - 31 mmol/L    Glucose Level 90 82 - 115 mg/dL    Blood Urea Nitrogen 8.0 (L) 9.8 - 20.1 mg/dL    Creatinine 0.70 0.55 - 1.02 mg/dL    Calcium Level Total 9.5 8.4 - 10.2 mg/dL    Protein Total 6.0 5.8 - 7.6 gm/dL    Albumin Level 3.6 3.4 - 4.8 g/dL    Globulin 2.4 2.4 - 3.5 gm/dL    Albumin/Globulin Ratio 1.5 1.1 - 2.0 ratio    Bilirubin Total 0.4 <=1.5 mg/dL    Alkaline Phosphatase 63 40 - 150 unit/L    Alanine Aminotransferase 15 0 - 55 unit/L    Aspartate Aminotransferase 20 5 - 34 unit/L    eGFR >60 mls/min/1.73/m2   Magnesium    Collection Time: 02/21/24  3:42 AM   Result Value Ref Range    Magnesium Level 2.00 1.60 - 2.60 mg/dL   Phosphorus    Collection Time: 02/21/24  3:42 AM   Result Value Ref Range    Phosphorus Level 3.6 2.3 - 4.7 mg/dL   Troponin I    Collection Time: 02/21/24  3:42 AM   Result Value Ref Range    Troponin-I <0.010 0.000 - 0.045 ng/mL   CK-MB    Collection Time: 02/21/24  3:42 AM   Result Value Ref Range    Creatine Kinase MB 1.2 <=3.4 ng/mL   APTT    Collection Time: 02/21/24  3:42 AM   Result Value Ref Range    PTT 34.3 (H) 23.2 - 33.7 seconds   Protime-INR    Collection Time: 02/21/24  3:42 AM   Result Value Ref Range    PT 13.2 12.5 - 14.5 seconds    INR 1.0 <=1.3   Hemoglobin A1C    Collection Time: 02/21/24  3:42 AM   Result Value Ref Range    Hemoglobin A1c 6.2 <=7.0 %    Estimated Average Glucose 131.2 mg/dL   CBC with Differential    Collection Time: 02/21/24  3:42 AM   Result Value Ref Range    WBC 8.05 4.50 - 11.50 x10(3)/mcL    RBC 4.13 (L) 4.20 - 5.40 x10(6)/mcL    Hgb  13.5 12.0 - 16.0 g/dL    Hct 40.2 37.0 - 47.0 %    MCV 97.3 (H) 80.0 - 94.0 fL    MCH 32.7 (H) 27.0 - 31.0 pg    MCHC 33.6 33.0 - 36.0 g/dL    RDW 13.9 11.5 - 17.0 %    Platelet 241 130 - 400 x10(3)/mcL    MPV 9.7 7.4 - 10.4 fL    Neut % 56.6 %    Lymph % 27.6 %    Mono % 12.0 %    Eos % 3.0 %    Basophil % 0.6 %    Lymph # 2.22 0.6 - 4.6 x10(3)/mcL    Neut # 4.55 2.1 - 9.2 x10(3)/mcL    Mono # 0.97 0.1 - 1.3 x10(3)/mcL    Eos # 0.24 0 - 0.9 x10(3)/mcL    Baso # 0.05 <=0.2 x10(3)/mcL    IG# 0.02 0 - 0.04 x10(3)/mcL    IG% 0.2 %    NRBC% 0.0 %     Significant Imaging:  Imaging Results              MRI Brain Without Contrast (Final result)  Result time 02/20/24 17:36:27      Final result by Meche Venegas MD (02/20/24 17:36:27)                   Impression:      Acute infarct in the right parietal region near the cerebral convexity    Chronic age related changes seen    Right maxillary sinusitis      Electronically signed by: Giovanni Venegas  Date:    02/20/2024  Time:    17:36               Narrative:    EXAMINATION:  MRI BRAIN WITHOUT CONTRAST    CLINICAL HISTORY:  Stroke, follow up;    TECHNIQUE:  Multiplanar multisequence MR imaging of the brain was performed without contrast.    COMPARISON:  CT scan dated 02/20/2024    FINDINGS:  No intracranial mass or lesion is seen.  No hemorrhage is seen.  There is an acute infarct seen in the right parietal region towards the cerebral convexity..  No diffusion abnormality seen.  There is diffuse cerebral atrophy seen along with some compensatory ventricular dilatation and periventricular white matter change consistent with patient's age.  Posterior fossa appears normal.  Calvarium is intact.  There is evidence of right maxillary sinusitis.                                       CTA STROKE MULTI-PHASE (Final result)  Result time 02/20/24 11:53:25      Final result by Liyah Barrett MD (02/20/24 11:53:25)                   Impression:      No large vessel occlusion  or flow-limiting stenosis.      Electronically signed by: Liyah Barrett  Date:    02/20/2024  Time:    11:53               Narrative:    EXAMINATION:  CTA STROKE MULTI-PHASE    TECHNIQUE:  Axial images obtained through the cervical region and [place] before and after the administration of intravenous contrast.    Coronal, sagittal, MIP and 3D reconstructions were obtained from the axial data.    Automatic exposure control was utilized to limit radiation dose.    Radiation Dose:    Total DLP: 3054 mGy*cm    COMPARISON:  CT head dated 02/20/2024    FINDINGS:  Head CT with contrast:    No interval changes when compared to the previous CT.    No enhancing abnormalities.    If present, stenosis of the carotid bulbs is measured based on NASCET criteria,    i.e. area of maximal stenosis compared to the cervical ICA distal to the bulb.    Cervical CTA:    The origins of the great vessels are patent.    The common carotid arteries, carotid bulbs and internal carotid arteries are patent.  There are mild calcifications at the carotid bulbs without hemodynamically significant stenosis.    The vertebral arteries are patent.    Intracranial CTA:    There are mild calcifications the course the carotid siphons without hemodynamically significant stenosis.  The middle cerebral arteries and anterior arteries are patent.    The right vertebral artery is hypoplastic with a dominant PICA branch.  The left vertebral artery, basilar artery and posterior cerebral arteries are patent.    The dural venous sinuses are patent.                                       CT HEAD FOR STROKE (Final result)  Result time 02/20/24 11:05:48   Procedure changed from CT Head Without Contrast     Final result by Liyah Barrett MD (02/20/24 11:05:48)                   Impression:      1. No acute intracranial abnormality.  2. Chronic microvascular ischemic changes.  Code fast findings given to Jelena TONG at the time of  dictation.      Electronically signed by: Liyah Barrett  Date:    02/20/2024  Time:    11:05               Narrative:    EXAMINATION:  CT HEAD FOR STROKE    CLINICAL HISTORY:  code fast;    TECHNIQUE:  Axial scans were obtained from skull base to the vertex.    Coronal and sagittal reconstructions obtained from the axial data.    Automatic exposure control was utilized to limit radiation dose.    Contrast: None    Radiation Dose:    Total DLP: 10 70 mGy*cm    COMPARISON:  None    FINDINGS:  There is no acute intracranial hemorrhage or edema. The gray-white matter differentiation is preserved.  Patchy hypodensities in the subcortical and periventricular white matter likely represent chronic microvascular ischemic changes.    There is no mass effect or midline shift.  There is diffuse parenchymal volume loss.  The basal cisterns are patent. There is no abnormal extra-axial fluid collection.  Carotid artery calcifications are noted.    The calvarium and skull base are intact.  There is fluid layering in the right maxillary sinus.                                    EKG:       Telemetry:  SR    Physical Exam  HENT:      Head: Normocephalic.      Nose: Nose normal.      Mouth/Throat:      Mouth: Mucous membranes are moist.   Eyes:      Extraocular Movements: Extraocular movements intact.   Cardiovascular:      Rate and Rhythm: Normal rate and regular rhythm.      Pulses: Normal pulses.      Heart sounds: Normal heart sounds.   Pulmonary:      Effort: Pulmonary effort is normal.      Breath sounds: Normal breath sounds.   Abdominal:      Palpations: Abdomen is soft.   Skin:     General: Skin is warm and dry.   Neurological:      Mental Status: She is alert and oriented to person, place, and time.   Psychiatric:         Behavior: Behavior normal.       Home Medications:   No current facility-administered medications on file prior to encounter.     Current Outpatient Medications on File Prior to Encounter   Medication Sig  Dispense Refill    ezetimibe (ZETIA) 10 mg tablet Take 10 mg by mouth every evening.      flecainide (TAMBOCOR) 100 MG Tab Take 100 mg by mouth 2 (two) times daily.      lisinopriL-hydrochlorothiazide (PRINZIDE,ZESTORETIC) 20-25 mg Tab Take 1 tablet by mouth once daily.      [DISCONTINUED] rosuvastatin (CRESTOR) 20 MG tablet Take 20 mg by mouth once daily.      alendronate (FOSAMAX) 70 MG tablet PLEASE SEE ATTACHED FOR DETAILED DIRECTIONS      [DISCONTINUED] ciprofloxacin HCl (CIPRO) 500 MG tablet Take 500 mg by mouth 2 (two) times daily.       Current Inpatient Medications:    Current Facility-Administered Medications:     atorvastatin tablet 40 mg, 40 mg, Oral, QHS, Dawit Ramachandran MD, 40 mg at 02/20/24 2137    bisacodyL suppository 10 mg, 10 mg, Rectal, Daily PRN, Danna Stacy, AGACNP-BC    cefTRIAXone (Rocephin) 1 g in dextrose 5 % in water (D5W) 100 mL IVPB (MB+), 1 g, Intravenous, Q24H, Dawit Ramachandran MD, Stopped at 02/20/24 1550    cetirizine tablet 10 mg, 10 mg, Oral, Daily, Dawit Ramachandran MD, 10 mg at 02/21/24 0801    clopidogreL tablet 75 mg, 75 mg, Oral, Daily, Myles Metzger MD, 75 mg at 02/21/24 0800    ezetimibe tablet 10 mg, 10 mg, Oral, QHS, Dawit Ramachandran MD, 10 mg at 02/20/24 2138    flecainide tablet 100 mg, 100 mg, Oral, BID, Dawit Ramachandran MD, 100 mg at 02/21/24 0800    fluticasone propionate 50 mcg/actuation nasal spray 100 mcg, 2 spray, Each Nostril, Daily, Dawit Ramachandran MD    hydroCHLOROthiazide tablet 25 mg, 25 mg, Oral, Daily, Dawit Ramachandran MD, 25 mg at 02/21/24 0800    labetaloL injection 10 mg, 10 mg, Intravenous, Q6H PRN, Danna Stacy, AGACNP-BC    lisinopriL tablet 20 mg, 20 mg, Oral, Daily, Dawit Ramachandran MD, 20 mg at 02/21/24 0801  VTE Risk Mitigation (From admission, onward)           Ordered     IP VTE HIGH RISK PATIENT  Once         02/20/24 1219     Place sequential compression device  Until discontinued         02/20/24 1219                   Assessment:   Right Parietal CVA    - Left Sided Deficits - Improving   GNR Cystitis  HTN  HLD  Hx of V-Tach    - On Flecainide     Plan:   Continue Plavix & statin per neuro recs.  Plan for ILR placement today.  R/B/A discussed with the patient and her family. They agree to proceed.   Consent obtained and placed on the chart.  F/U with Dr. Gatica at discharge.     Thank you for your consult.     SERGIO Werner  Cardiology  [facility] - 4th Floor Medical Telemetry  02/21/2024     I agree with the findings of the complexity of problems addressed and take responsibility for the plan's risks and complications. I approved the plan documented by Aleyda Rocha NP.  For ILR today- Acute CVA ( right parietal)  Risks, indication, benefits and alternatives discussed with the patient who agrees to proceed.

## 2024-02-21 NOTE — ASSESSMENT & PLAN NOTE
- presented with left-sided weakness and LUE ataxia  - stroke RF:  HTN, HLD, BREN, CAD  - interventions: None.  OOW for TNK.  No LVO on CTA.  - etiology: likely small vessel disease    Stroke workup:  -CTH:  1. No acute intracranial abnormality.  2. Chronic microvascular ischemic changes.   -CTA h/n:  No large vessel occlusion or flow-limiting stenosis  -MRI brain:  1.  Acute infarct in the right parietal region near the cerebral convexity  2.  Chronic age related changes seen  3.  Right maxillary sinusitis  -ECHO:  EF 50-55%, bubble study negative, normal LA  -CUS:  Bilateral ICA less than 50% stenosis  -LDL: 64  -A1c:  6.2  -TSH:  1.283      Plan:  -Continue Plavix 75mg daily ... will avoid DAPT due to h/o excessive bruising with ASA alone.    -Continue Atorvastatin 40mg daily  -consult cardiology for ILR placement    Further recommendations to follow by MD.

## 2024-02-22 LAB
BACTERIA UR CULT: ABNORMAL
OHS QRS DURATION: 112 MS
OHS QTC CALCULATION: 490 MS

## 2024-05-27 PROBLEM — I63.9 STROKE: Status: RESOLVED | Noted: 2024-02-21 | Resolved: 2024-05-27

## 2024-12-10 ENCOUNTER — LAB VISIT (OUTPATIENT)
Dept: LAB | Facility: HOSPITAL | Age: 79
End: 2024-12-10
Attending: NURSE PRACTITIONER
Payer: MEDICARE

## 2024-12-10 DIAGNOSIS — R94.31 NONSPECIFIC ABNORMAL ELECTROCARDIOGRAM (ECG) (EKG): Primary | ICD-10-CM

## 2024-12-10 DIAGNOSIS — Z86.79 PERSONAL HISTORY OF UNSPECIFIED CIRCULATORY DISEASE: ICD-10-CM

## 2024-12-10 DIAGNOSIS — E78.5 HYPERLIPIDEMIA, UNSPECIFIED HYPERLIPIDEMIA TYPE: ICD-10-CM

## 2024-12-10 DIAGNOSIS — Z86.73 PERSONAL HISTORY OF TRANSIENT CEREBRAL ISCHEMIA: ICD-10-CM

## 2024-12-10 LAB
ALBUMIN SERPL-MCNC: 3.9 G/DL (ref 3.4–4.8)
ALBUMIN/GLOB SERPL: 1.3 RATIO (ref 1.1–2)
ALP SERPL-CCNC: 71 UNIT/L (ref 40–150)
ALT SERPL-CCNC: 40 UNIT/L (ref 0–55)
ANION GAP SERPL CALC-SCNC: 10 MEQ/L
AST SERPL-CCNC: 35 UNIT/L (ref 5–34)
BILIRUB SERPL-MCNC: 0.8 MG/DL
BUN SERPL-MCNC: 9.1 MG/DL (ref 9.8–20.1)
CALCIUM SERPL-MCNC: 9.7 MG/DL (ref 8.4–10.2)
CHLORIDE SERPL-SCNC: 103 MMOL/L (ref 98–107)
CHOLEST SERPL-MCNC: 133 MG/DL
CHOLEST/HDLC SERPL: 2 {RATIO} (ref 0–5)
CO2 SERPL-SCNC: 26 MMOL/L (ref 23–31)
CREAT SERPL-MCNC: 0.8 MG/DL (ref 0.55–1.02)
CREAT/UREA NIT SERPL: 11
ERYTHROCYTE [DISTWIDTH] IN BLOOD BY AUTOMATED COUNT: 15 % (ref 11.5–17)
GFR SERPLBLD CREATININE-BSD FMLA CKD-EPI: >60 ML/MIN/1.73/M2
GLOBULIN SER-MCNC: 3 GM/DL (ref 2.4–3.5)
GLUCOSE SERPL-MCNC: 107 MG/DL (ref 82–115)
HCT VFR BLD AUTO: 39.5 % (ref 37–47)
HDLC SERPL-MCNC: 69 MG/DL (ref 35–60)
HGB BLD-MCNC: 13.7 G/DL (ref 12–16)
LDLC SERPL CALC-MCNC: 55 MG/DL (ref 50–140)
MAGNESIUM SERPL-MCNC: 2.1 MG/DL (ref 1.6–2.6)
MCH RBC QN AUTO: 33.6 PG (ref 27–31)
MCHC RBC AUTO-ENTMCNC: 34.7 G/DL (ref 33–36)
MCV RBC AUTO: 96.8 FL (ref 80–94)
NRBC BLD AUTO-RTO: 0 %
PLATELET # BLD AUTO: 223 X10(3)/MCL (ref 130–400)
PMV BLD AUTO: 10 FL (ref 7.4–10.4)
POTASSIUM SERPL-SCNC: 3.4 MMOL/L (ref 3.5–5.1)
PROT SERPL-MCNC: 6.9 GM/DL (ref 5.8–7.6)
RBC # BLD AUTO: 4.08 X10(6)/MCL (ref 4.2–5.4)
SODIUM SERPL-SCNC: 139 MMOL/L (ref 136–145)
TRIGL SERPL-MCNC: 47 MG/DL (ref 37–140)
TSH SERPL-ACNC: 1.36 UIU/ML (ref 0.35–4.94)
VLDLC SERPL CALC-MCNC: 9 MG/DL
WBC # BLD AUTO: 8.07 X10(3)/MCL (ref 4.5–11.5)

## 2024-12-10 PROCEDURE — 36415 COLL VENOUS BLD VENIPUNCTURE: CPT

## 2024-12-10 PROCEDURE — 84443 ASSAY THYROID STIM HORMONE: CPT

## 2024-12-10 PROCEDURE — 80061 LIPID PANEL: CPT

## 2024-12-10 PROCEDURE — 83735 ASSAY OF MAGNESIUM: CPT

## 2024-12-10 PROCEDURE — 85027 COMPLETE CBC AUTOMATED: CPT

## 2024-12-10 PROCEDURE — 80053 COMPREHEN METABOLIC PANEL: CPT

## 2024-12-21 ENCOUNTER — OFFICE VISIT (OUTPATIENT)
Dept: URGENT CARE | Facility: CLINIC | Age: 79
End: 2024-12-21
Payer: MEDICARE

## 2024-12-21 VITALS
BODY MASS INDEX: 28.68 KG/M2 | TEMPERATURE: 98 F | DIASTOLIC BLOOD PRESSURE: 90 MMHG | OXYGEN SATURATION: 98 % | HEART RATE: 64 BPM | SYSTOLIC BLOOD PRESSURE: 161 MMHG | WEIGHT: 168 LBS | RESPIRATION RATE: 18 BRPM | HEIGHT: 64 IN

## 2024-12-21 DIAGNOSIS — R30.0 DYSURIA: ICD-10-CM

## 2024-12-21 DIAGNOSIS — N39.0 URINARY TRACT INFECTION WITH HEMATURIA, SITE UNSPECIFIED: Primary | ICD-10-CM

## 2024-12-21 DIAGNOSIS — R31.9 URINARY TRACT INFECTION WITH HEMATURIA, SITE UNSPECIFIED: Primary | ICD-10-CM

## 2024-12-21 LAB
BILIRUB UR QL STRIP: NEGATIVE
GLUCOSE UR QL STRIP: NEGATIVE
KETONES UR QL STRIP: NEGATIVE
LEUKOCYTE ESTERASE UR QL STRIP: POSITIVE
PH, POC UA: 6
POC BLOOD, URINE: NEGATIVE
POC NITRATES, URINE: NEGATIVE
PROT UR QL STRIP: NEGATIVE
SP GR UR STRIP: 1.01 (ref 1–1.03)
UROBILINOGEN UR STRIP-ACNC: NORMAL (ref 0.1–1.1)

## 2024-12-21 PROCEDURE — 81003 URINALYSIS AUTO W/O SCOPE: CPT | Mod: QW,,, | Performed by: NURSE PRACTITIONER

## 2024-12-21 PROCEDURE — 99213 OFFICE O/P EST LOW 20 MIN: CPT | Mod: ,,, | Performed by: NURSE PRACTITIONER

## 2024-12-21 RX ORDER — CLOPIDOGREL BISULFATE 75 MG/1
75 TABLET ORAL DAILY
COMMUNITY

## 2024-12-21 RX ORDER — NITROFURANTOIN 25; 75 MG/1; MG/1
100 CAPSULE ORAL 2 TIMES DAILY
Qty: 14 CAPSULE | Refills: 0 | Status: SHIPPED | OUTPATIENT
Start: 2024-12-21 | End: 2024-12-28

## 2024-12-21 RX ORDER — PHENAZOPYRIDINE HYDROCHLORIDE 200 MG/1
200 TABLET, FILM COATED ORAL 3 TIMES DAILY PRN
Qty: 9 TABLET | Refills: 0 | Status: SHIPPED | OUTPATIENT
Start: 2024-12-21 | End: 2024-12-24

## 2024-12-21 NOTE — PROGRESS NOTES
"Subjective:      Patient ID: Shelli Flores is a 79 y.o. female.    Vitals:  height is 5' 4" (1.626 m) and weight is 76.2 kg (168 lb). Her temperature is 97.5 °F (36.4 °C). Her blood pressure is 161/90 (abnormal) and her pulse is 64. Her respiration is 18 and oxygen saturation is 98%.     Chief Complaint: Urinary Tract Infection     Patient is a 79 y.o. female who presents to urgent care with complaints of burning with urination and blood in urine, she has an appointment in January with a urologist x2 days.      Constitution: Negative. Negative for fever.   HENT: Negative.     Neck: neck negative.   Cardiovascular: Negative.    Eyes: Negative.    Respiratory: Negative.     Gastrointestinal: Negative.  Negative for abdominal pain.   Endocrine: negative.   Genitourinary:  Positive for dysuria, frequency, urgency and hematuria.   Musculoskeletal: Negative.  Negative for back pain.   Skin: Negative.    Allergic/Immunologic: Negative.    Neurological: Negative.    Hematologic/Lymphatic: Negative.    Psychiatric/Behavioral: Negative.        Objective:     Physical Exam   Constitutional: She is oriented to person, place, and time. She appears well-developed. She is cooperative.   HENT:   Head: Normocephalic and atraumatic.   Ears:   Right Ear: Hearing, tympanic membrane, external ear and ear canal normal.   Left Ear: Hearing, tympanic membrane, external ear and ear canal normal.   Nose: Nose normal. No mucosal edema or nasal deformity. No epistaxis. Right sinus exhibits no maxillary sinus tenderness and no frontal sinus tenderness. Left sinus exhibits no maxillary sinus tenderness and no frontal sinus tenderness.   Mouth/Throat: Uvula is midline, oropharynx is clear and moist and mucous membranes are normal. No trismus in the jaw. Normal dentition. No uvula swelling.   Eyes: Conjunctivae and lids are normal.   Neck: Trachea normal and phonation normal. Neck supple.   Cardiovascular: Normal rate, regular rhythm, normal " heart sounds and normal pulses.   Pulmonary/Chest: Effort normal and breath sounds normal.   Abdominal: Normal appearance and bowel sounds are normal. Soft.   Musculoskeletal: Normal range of motion.         General: Normal range of motion.   Neurological: She is alert and oriented to person, place, and time. She exhibits normal muscle tone.   Skin: Skin is warm, dry and intact.   Psychiatric: Her speech is normal and behavior is normal. Judgment and thought content normal.   Nursing note and vitals reviewed.      Assessment:     1. Urinary tract infection with hematuria, site unspecified    2. Dysuria        Plan:     A urinary tract infection (UTI) is caused by bacteria that get inside your urinary tract. Your urinary tract includes your kidneys, ureters, bladder, and urethra. A UTI is more common in your lower urinary tract, which includes your bladder and urethra.      DISCHARGE INSTRUCTIONS:  Seek care immediately if:    You are urinating very little or not at all.  You have a high fever with shaking chills.  You have side or back pain that gets worse.  Call your doctor if:  You have a fever.  You do not feel better after 2 days of taking antibiotics.  You have new symptoms, such as blood or pus in your urine.  You are vomiting.  You have questions or concerns about your condition or care.    Medicines:  Antibiotics treat a bacterial infection. If you have UTIs often (called recurrent UTIs), you may be given antibiotics to take regularly. You will be given directions for when and how to use antibiotics. The goal is to prevent UTIs but not cause antibiotic resistance by using antibiotics too often.  Medicines may be given to decrease pain and burning when you urinate. They will also help decrease the feeling that you need to urinate often. These medicines may make your urine orange or red.  Take your medicine as directed. Contact your healthcare provider if you think your medicine is not helping or if you have  side effects. Tell your provider if you are allergic to any medicine. Keep a list of the medicines, vitamins, and herbs you take. Include the amounts, and when and why you take them. Bring the list or the pill bottles to follow-up visits. Carry your medicine list with you in case of an emergency.  Follow up with your doctor as directed:  Write down your questions so you remember to ask them during your visits.    Prevent another UTI:  Empty your bladder often. Urinate and empty your bladder as soon as you feel the need. Do not hold your urine for long periods of time.  Wipe from front to back after you urinate or have a bowel movement. This will help prevent germs from getting into your urinary tract through your urethra.  Drink liquids as directed. Ask how much liquid to drink each day and which liquids are best for you. You may need to drink more liquids than usual to help flush out the bacteria. Do not drink alcohol, caffeine, or citrus juices. These can irritate your bladder and increase your symptoms. Your healthcare provider may recommend cranberry juice to help prevent a UTI.  Urinate before and after you have sex. This can help flush out bacteria passed during sex.  Do not douche or use feminine deodorants. These can change the chemical balance in your vagina.  Change sanitary pads or tampons often. This will help prevent germs from getting into your urinary tract.  Talk to your healthcare provider about your birth control method. You may need to change your method if it is increasing your risk for UTIs.  Wear cotton underwear and clothes that are loose. Tight pants and nylon underwear can trap moisture and cause bacteria to grow.  Vaginal estrogen may be recommended. This medicine helps prevent UTIs in women who have gone through menopause or are in serena-menopause.  Do pelvic muscle exercises often. Pelvic muscle exercises may help you start and stop urinating. Strong pelvic muscles may help you empty your  bladder easier. Squeeze these muscles tightly for 5 seconds like you are trying to hold back urine. Then relax for 5 seconds. Gradually work up to squeezing for 10 seconds. Do 3 sets of 15 repetitions a day, or as directed.      Urinary tract infection with hematuria, site unspecified  -     nitrofurantoin, macrocrystal-monohydrate, (MACROBID) 100 MG capsule; Take 1 capsule (100 mg total) by mouth 2 (two) times daily. for 7 days  Dispense: 14 capsule; Refill: 0  -     phenazopyridine (PYRIDIUM) 200 MG tablet; Take 1 tablet (200 mg total) by mouth 3 (three) times daily as needed for Pain.  Dispense: 9 tablet; Refill: 0    Dysuria  -     POCT Urinalysis, Dipstick, Manual, w/o Scope

## 2024-12-21 NOTE — PATIENT INSTRUCTIONS
A urinary tract infection (UTI) is caused by bacteria that get inside your urinary tract. Your urinary tract includes your kidneys, ureters, bladder, and urethra. A UTI is more common in your lower urinary tract, which includes your bladder and urethra.      DISCHARGE INSTRUCTIONS:  Seek care immediately if:    You are urinating very little or not at all.  You have a high fever with shaking chills.  You have side or back pain that gets worse.  Call your doctor if:  You have a fever.  You do not feel better after 2 days of taking antibiotics.  You have new symptoms, such as blood or pus in your urine.  You are vomiting.  You have questions or concerns about your condition or care.    Medicines:  Antibiotics treat a bacterial infection. If you have UTIs often (called recurrent UTIs), you may be given antibiotics to take regularly. You will be given directions for when and how to use antibiotics. The goal is to prevent UTIs but not cause antibiotic resistance by using antibiotics too often.  Medicines may be given to decrease pain and burning when you urinate. They will also help decrease the feeling that you need to urinate often. These medicines may make your urine orange or red.  Take your medicine as directed. Contact your healthcare provider if you think your medicine is not helping or if you have side effects. Tell your provider if you are allergic to any medicine. Keep a list of the medicines, vitamins, and herbs you take. Include the amounts, and when and why you take them. Bring the list or the pill bottles to follow-up visits. Carry your medicine list with you in case of an emergency.  Follow up with your doctor as directed:  Write down your questions so you remember to ask them during your visits.    Prevent another UTI:  Empty your bladder often. Urinate and empty your bladder as soon as you feel the need. Do not hold your urine for long periods of time.  Wipe from front to back after you urinate or have  a bowel movement. This will help prevent germs from getting into your urinary tract through your urethra.  Drink liquids as directed. Ask how much liquid to drink each day and which liquids are best for you. You may need to drink more liquids than usual to help flush out the bacteria. Do not drink alcohol, caffeine, or citrus juices. These can irritate your bladder and increase your symptoms. Your healthcare provider may recommend cranberry juice to help prevent a UTI.  Urinate before and after you have sex. This can help flush out bacteria passed during sex.  Do not douche or use feminine deodorants. These can change the chemical balance in your vagina.  Change sanitary pads or tampons often. This will help prevent germs from getting into your urinary tract.  Talk to your healthcare provider about your birth control method. You may need to change your method if it is increasing your risk for UTIs.  Wear cotton underwear and clothes that are loose. Tight pants and nylon underwear can trap moisture and cause bacteria to grow.  Vaginal estrogen may be recommended. This medicine helps prevent UTIs in women who have gone through menopause or are in serena-menopause.  Do pelvic muscle exercises often. Pelvic muscle exercises may help you start and stop urinating. Strong pelvic muscles may help you empty your bladder easier. Squeeze these muscles tightly for 5 seconds like you are trying to hold back urine. Then relax for 5 seconds. Gradually work up to squeezing for 10 seconds. Do 3 sets of 15 repetitions a day, or as directed.

## 2025-03-18 ENCOUNTER — HOSPITAL ENCOUNTER (OUTPATIENT)
Dept: RADIOLOGY | Facility: HOSPITAL | Age: 80
Discharge: HOME | End: 2025-03-18
Attending: OBSTETRICS & GYNECOLOGY
Payer: MEDICARE

## 2025-03-18 DIAGNOSIS — Z12.31 ENCOUNTER FOR SCREENING MAMMOGRAM FOR BREAST CANCER: ICD-10-CM

## 2025-03-18 PROCEDURE — 77063 BREAST TOMOSYNTHESIS BI: CPT | Mod: TC

## (undated) DEVICE — TOWELS STERILE 18 X 25.5

## (undated) DEVICE — APPLICATOR CHLORAPREP CLR 10.5

## (undated) DEVICE — DRAPE UTILITY W/ TAPE 20X30IN

## (undated) DEVICE — DRAPE MEDIUM SHEET 40X70IN

## (undated) DEVICE — ADHESIVE DERMABOND ADVANCED

## (undated) DEVICE — CLOSURE SKIN STERI STRIP 1/2X4

## (undated) DEVICE — GAUZE SPONGE 4'X4 12 PLY